# Patient Record
Sex: MALE | Race: WHITE | URBAN - METROPOLITAN AREA
[De-identification: names, ages, dates, MRNs, and addresses within clinical notes are randomized per-mention and may not be internally consistent; named-entity substitution may affect disease eponyms.]

---

## 2021-01-14 ENCOUNTER — OFFICE VISIT (OUTPATIENT)
Dept: ORTHOPEDIC SURGERY | Age: 56
End: 2021-01-14
Payer: COMMERCIAL

## 2021-01-14 VITALS — HEIGHT: 70 IN | WEIGHT: 180 LBS | BODY MASS INDEX: 25.77 KG/M2

## 2021-01-14 DIAGNOSIS — M25.511 RIGHT SHOULDER PAIN, UNSPECIFIED CHRONICITY: Primary | ICD-10-CM

## 2021-01-14 DIAGNOSIS — S46.011A TRAUMATIC COMPLETE TEAR OF RIGHT ROTATOR CUFF, INITIAL ENCOUNTER: ICD-10-CM

## 2021-01-14 PROCEDURE — 99203 OFFICE O/P NEW LOW 30 MIN: CPT | Performed by: ORTHOPAEDIC SURGERY

## 2021-01-14 RX ORDER — FEXOFENADINE HYDROCHLORIDE 60 MG/1
60 TABLET, FILM COATED ORAL PRN
COMMUNITY

## 2021-01-14 RX ORDER — TERBINAFINE HYDROCHLORIDE 250 MG/1
TABLET ORAL
COMMUNITY
Start: 2020-10-09

## 2021-01-14 RX ORDER — AZELASTINE 1 MG/ML
SPRAY, METERED NASAL
COMMUNITY
Start: 2020-03-23

## 2021-01-14 RX ORDER — LOSARTAN POTASSIUM 25 MG/1
TABLET ORAL
COMMUNITY
Start: 2020-12-31 | End: 2021-06-03

## 2021-01-14 RX ORDER — EPINEPHRINE 0.3 MG/.3ML
INJECTION SUBCUTANEOUS
COMMUNITY
Start: 2020-04-06 | End: 2021-06-03

## 2021-01-14 RX ORDER — LANSOPRAZOLE 30 MG/1
CAPSULE, DELAYED RELEASE ORAL
COMMUNITY
Start: 2020-12-23

## 2021-01-14 SDOH — HEALTH STABILITY: MENTAL HEALTH: HOW MANY STANDARD DRINKS CONTAINING ALCOHOL DO YOU HAVE ON A TYPICAL DAY?: NOT ASKED

## 2021-01-14 NOTE — LETTER
[facility]  Surgery Precert & Billing Form:    DEMOGRAPHICS:                                                                                                       Patient Name:  Edyta Oliveros  Patient :  1965   Patient SS#:      Patient Phone:  [phone] (home) [phone] (work) Alt. Patient Phone:    Patient Address:  [place] [id number]    PCP:  No primary care provider on file. Insurance: Work comp     DIAGNOSIS & PROCEDURE:                                                                                      Diagnosis:   Right shoulder pain, unspecified chronicity  - Primary M25.511       Operation: right    SURGERY  INFORMATION  Date of Surgery:      Location:       Type:    Outpatient  23 hour hold:  No  Surgeon:        Ki Hill.  Jennifer Snowden MD         21     BILLING INFORMATION:                                                                                                Physician Procedure                                            CPT Codes                      PA, or Fellow Procedure                                      CPT Codes                      Precert information:

## 2021-01-14 NOTE — PROGRESS NOTES
[facility] [address] [facility]  Office Visit  Shoulder Pain  Date:  2021    Name:  Carly Conn  Address:  [address]    :  1965      Age:   60 y.o.    SSN:  [id number]      Medical Record Number:  <[id number]>    Chief Complaint:    Right right shoulder Pain    HPI:   Carly Conn is a 60 y.o. right hand dominant male who presents today for evaluation of the right shoulder. Patient is here for second opinion over his right shoulder. Patient is status post right shoulder arthroscopy with rotator cuff repair and open subpectoral biceps tenodesis on  by Dr. Shu Dunn. Patient's postoperative course was complicated by stiffness as well as increasing pain. He did not progress well with physical therapy despite his compliance with his sling and other postoperative recommendations. He was then followed up and another MRI was ordered which found him to have a retear of the rotator cuff. His prior surgeon did not feel that he was repairable and the patient is here today for a second opinion. 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 He denies any new numbness, tingling, fevers, chills, chest pain, shortness of breath, or any other new significant symptoms. Pain Assessment  Location of Pain: Shoulder  Location Modifiers: Right  Severity of Pain: 0  Quality of Pain: Sharp  Duration of Pain: A few minutes  Frequency of Pain: Intermittent  Aggravating Factors:  Other (Comment)  Limiting Behavior: Yes  Relieving Factors: Nsaids  Result of Injury: Yes  Work-Related Injury: Yes  Are there other pain locations you wish to document?: No    Past History:  Past Medical History:   Diagnosis Date    HTN (hypertension)        Past Surgical History:   Procedure Laterality Date    RECTAL SURGERY  SHOULDER SURGERY Right     TONSILLECTOMY         Social History     Tobacco Use    Smoking status: Never Smoker    Smokeless tobacco: Never Used   Substance Use Topics    Alcohol use: Never     Frequency: Never     Binge frequency: Never    Drug use: Never        Family History:  family history is not on file. Current Outpatient Medications:     terbinafine (LAMISIL) 250 MG tablet, , Disp: , Rfl:     mupirocin (BACTROBAN) 2 % ointment, , Disp: , Rfl:     losartan (COZAAR) 25 MG tablet, , Disp: , Rfl:     lansoprazole (PREVACID) 30 MG delayed release capsule, , Disp: , Rfl:     fexofenadine (ALLEGRA) 60 MG tablet, Take 60 mg by mouth as needed, Disp: , Rfl:     EPINEPHrine (EPIPEN) 0.3 MG/0.3ML SOAJ injection, One injection as needed for allergic reaction. May repeat., Disp: , Rfl:     azelastine (ASTELIN) 0.1 % nasal spray, USE 2 SPRAYS IN EACH NOSTRIL 2 TIMES DAILY, Disp: , Rfl:     MELOXICAM PO, Take by mouth, Disp: , Rfl:     Allergies   Allergen Reactions    Penicillins Hives     NOT REALLY SURE  LONG TIME AGO  NOT REALLY SURE  LONG TIME AGO           Review of Systems: A 14 point review of systems available in the scanned medical record as documented by the patient on 1/14/2021. Today's review pertinent items are noted in HPI. No notes on file    Physical Exam:  Ht 5' 10\" (1.778 m)   Wt 180 lb (81.6 kg)   BMI 25.83 kg/m²     General: No acute distress, well nourished  CV: No obvious peripheral edema. Normal peripheral pulses  Neuro: Alert & oriented x 3  Psych: Normal mood and affect    Right shoulder exam    Inspection:  Held in a normal posture. Normal contour at the acromioclavicular joint. No swelling, ecchymosis, or erythema about the shoulder. No atrophy appreciated. No scapular winging.      Palpation: Tenderness to palpation over the greater tuberosity, no tenderness over the [facility] joint or bicipital groove Range of Motion: Active and passive range of motion is limited to 110 degrees of forward flexion, 15 degrees of external rotation and internal rotation to the belt loop. Internal rotation does improve to the midline with passive motion    Strength: 4/5 supraspinatus, infraspinatus, and 4/5 subscapularis muscle strength. Stability: No anterior instability. No posterior instability. Special Tests: Positive champagne toast, positive [place]    Other findings: The skin is warm dry and well perfused. 2+ radial pulse. Sensation is intact to light touch over the deltoid. Radiographic:  Prior MRI was reviewed with the patient which shows a type II failure of the supraspinatus repair with some superior subscap tendon tearing    Assessment:  Angie Hernandez is a 60 y.o. male with right shoulder:  1. Recurrent rotator cuff tear  2. Subscapularis tear    Impression:  Encounter Diagnoses   Name Primary?  Right shoulder pain, unspecified chronicity Yes    Traumatic complete tear of right rotator cuff, initial encounter        Office Procedures:  No orders of the defined types were placed in this encounter. Plan:   Pertinent imaging was reviewed. The etiology, natural history, and treatment options for the disorder were discussed. The roles of activity modifications, medications, injections, physical therapy, and surgical interventions were all described to the patient and questions were answered. We had a long discussion with Linda Dhillon today regarding his right shoulder rotator cuff tear. At this point we would recommend revision rotator cuff repair with patch augmentation and subscap repair. After explaining the procedure to the patient along with its risks and benefits the patient elected to proceed. We did state that we recommend doing this sooner than later but it is not an emergency. The patient needs time to get his affairs in order and to make sure the form is covered prior to signing up. We will tentatively plan on February 1 [place] [facility]. He will need Covid testing as well as clearance from his primary care physician    Sierra Howe, [address] [facility]     01/14/21  2:53 PM    The encounter with Sydni Cardoso was supervised by Dr Main Wheeler who personally examined the patient and reviewed the plan. This dictation was performed with a verbal recognition program (DRAGON) and it was checked for errors. It is possible that there are still dictated errors within this office note. If so, please bring any errors to my attention for an addendum. All efforts were made to ensure that this office note is accurate.   ___________________  I was physically present and personally supervised the Orthopaedic Sports Medicine Fellow in the evaluation and development of a treatment plan for this patient. I personally interviewed the patient and performed a physical examination. In addition, I discussed the patient's condition and treatment options with them. I have also reviewed and agree with the past medical, family and social history unless otherwise noted. All of the patient's questions were answered. Nancy Wheeler MD, PhD  1/14/2021

## 2021-01-20 ENCOUNTER — TELEPHONE (OUTPATIENT)
Dept: ORTHOPEDIC SURGERY | Age: 56
End: 2021-01-20

## 2021-01-21 ENCOUNTER — TELEPHONE (OUTPATIENT)
Dept: ORTHOPEDIC SURGERY | Age: 56
End: 2021-01-21

## 2021-01-21 ENCOUNTER — OFFICE VISIT (OUTPATIENT)
Dept: ORTHOPEDIC SURGERY | Age: 56
End: 2021-01-21
Payer: COMMERCIAL

## 2021-01-21 VITALS — WEIGHT: 180 LBS | BODY MASS INDEX: 25.77 KG/M2 | HEIGHT: 70 IN

## 2021-01-21 DIAGNOSIS — S46.011A TRAUMATIC COMPLETE TEAR OF RIGHT ROTATOR CUFF, INITIAL ENCOUNTER: ICD-10-CM

## 2021-01-21 DIAGNOSIS — M25.511 RIGHT SHOULDER PAIN, UNSPECIFIED CHRONICITY: Primary | ICD-10-CM

## 2021-01-21 DIAGNOSIS — M75.121 NONTRAUMATIC COMPLETE TEAR OF RIGHT ROTATOR CUFF: ICD-10-CM

## 2021-01-21 PROBLEM — F32.A DEPRESSION: Status: ACTIVE | Noted: 2021-01-21

## 2021-01-21 PROBLEM — K62.89 ANORECTAL PAIN: Status: ACTIVE | Noted: 2019-11-20

## 2021-01-21 PROBLEM — J30.2 SEASONAL ALLERGIES: Status: ACTIVE | Noted: 2021-01-21

## 2021-01-21 PROBLEM — M50.30 DEGENERATIVE DISC DISEASE, CERVICAL: Status: ACTIVE | Noted: 2020-07-16

## 2021-01-21 PROBLEM — M47.22 OSTEOARTHRITIS OF SPINE WITH RADICULOPATHY, CERVICAL REGION: Status: ACTIVE | Noted: 2020-07-16

## 2021-01-21 PROBLEM — K60.2 ANAL FISSURE: Status: ACTIVE | Noted: 2019-12-05

## 2021-01-21 PROBLEM — M75.41 IMPINGEMENT SYNDROME, SHOULDER, RIGHT: Status: ACTIVE | Noted: 2020-05-19

## 2021-01-21 PROBLEM — K62.89 PROCTITIS: Status: ACTIVE | Noted: 2019-11-12

## 2021-01-21 PROBLEM — M54.6 CHRONIC LEFT-SIDED THORACIC BACK PAIN: Status: ACTIVE | Noted: 2018-04-26

## 2021-01-21 PROBLEM — F41.9 ANXIETY: Status: ACTIVE | Noted: 2021-01-21

## 2021-01-21 PROBLEM — K59.09 CONSTIPATION, CHRONIC: Status: ACTIVE | Noted: 2018-07-27

## 2021-01-21 PROBLEM — E78.01 FAMILIAL HYPERCHOLESTEROLEMIA: Status: ACTIVE | Noted: 2020-01-29

## 2021-01-21 PROBLEM — I10 ESSENTIAL HYPERTENSION: Status: ACTIVE | Noted: 2020-12-31

## 2021-01-21 PROBLEM — G89.29 CHRONIC LEFT-SIDED THORACIC BACK PAIN: Status: ACTIVE | Noted: 2018-04-26

## 2021-01-21 PROBLEM — M54.12 CERVICAL RADICULOPATHY: Status: ACTIVE | Noted: 2020-05-19

## 2021-01-21 PROCEDURE — 99213 OFFICE O/P EST LOW 20 MIN: CPT | Performed by: ORTHOPAEDIC SURGERY

## 2021-01-21 NOTE — PROGRESS NOTES
Chief Complaint  Chief Complaint   Patient presents with    Shoulder Pain     PREOP RIGHT SHOULDER       History of Present Illness:  Tiffanie Patino is a 60 y.o. male here for follow-up and preop evaluation of the right shoulder. Patient had right shoulder arthroscopic rotator cuff repair and open subpectoral biceps tenodesis on August 25 by Dr. Naz Kapoor. Unfortunately, his postop recovery has been complicated by increasing pain and difficulty with physical therapy demands. He feels that he has not been able to regain his strength or function and is now limited by loss of range of motion. He presented on 1/14/2021 for a second opinion and repeat MRI and was found to have a retear of the supraspinatus tendon. We also found tearing of the superior aspect of the sub-scapularis. The patient presents today to review the procedure at hand and to answer a few questions. He denies any new injury to the shoulder since the last visit. The patient is currently on the operative schedule for February 1, 2021 at [address] History:  Patient's medications, allergies, past medical, surgical, social and family histories were reviewed and updated as appropriate. Pertinent items are noted in HPI  Review of systems reviewed from Patient History Form dated on 1/21/2021 and available in the patient's chart under the Media tab. Vital Signs: There were no vitals filed for this visit. Neuro: Alert & oriented x 3,  normal,  no focal deficits noted. Normal affect. Eyes: sclera clear  Ears: Normal external ear  Mouth:  No perioral lesions  Pulm: Respirations unlabored and regular  Pulse: Regular rate and rhythm   Skin: Warm, well perfused      Constitutional: The physical examination finds the patient to be well-developed and well-nourished. The patient is alert and oriented x3 and was cooperative throughout the visit.       Right shoulder exam Plan: Today we were able to discuss his upcoming surgery which is scheduled as a right shoulder arthroscopy with revision supraspinatus tendon repair with patch augmentation and possible subscapularis repair. We were able to answer all of his questions during this visit. We discussed the necessity of following up with his PCP for his preoperative assessment as well as a Covid test at [facility].  We will see the patient in the preoperative holding area on February 1. Haroldo Anderson is in agreement with this plan. All questions were answered to patient's satisfaction and was encouraged to call with any further questions. Alessandra Villarreal, [address] [facility]     01/21/21  4:41 PM  The encounter with Geeta Virk was supervised by Dr. Raza Hector who personally examined the patient and reviewed the plan. This dictation was performed with a verbal recognition program (DRAGON) and it was checked for errors. It is possible that there are still dictated errors within this office note. If so, please bring any errors to my attention for an addendum. All efforts were made to ensure that this office note is accurate.   _______________  I was physically present and personally supervised the Orthopaedic Sports Medicine Fellow in the evaluation and development of a treatment plan for this patient. I personally interviewed the patient and performed a physical examination. In addition, I discussed the patient's condition and treatment options with them. I have also reviewed and agree with the past medical, family and social history unless otherwise noted. All of the patient's questions were answered. Nancy Hector MD, PhD  1/21/2021

## 2021-01-22 ENCOUNTER — TELEPHONE (OUTPATIENT)
Dept: ORTHOPEDIC SURGERY | Age: 56
End: 2021-01-22

## 2021-01-22 NOTE — TELEPHONE ENCOUNTER
General Question     Subject: SX 02/01/21 R SHOULDER / QUESTION ABOUT PHYSICAL THERAPY & PAPERWORK  Patient and /or Facility Request: [place] Number: [phone]

## 2021-01-22 NOTE — TELEPHONE ENCOUNTER
General Question     Subject: PATIENT STATED HE WAS RETURNING [address].   Patient Enedelia Pollock  Contact Number: [phone]

## 2021-02-02 ENCOUNTER — TELEPHONE (OUTPATIENT)
Dept: ORTHOPEDIC SURGERY | Age: 56
End: 2021-02-02

## 2021-02-02 NOTE — TELEPHONE ENCOUNTER
Medical Facility Question     Facility Name: [address] / [facility]  Contact Name: Akash Headings  Contact Number: [phone]  Request or Information: ADAM DORSEY S / [facility] #  [phone]  PATIENT HAS APPOINTMENT TODAY @ 2:00PM

## 2021-02-02 NOTE — TELEPHONE ENCOUNTER
Ruben Moody is calling from physical therapy needing a script for physical therapy and a protocol to be faxed to [phone]. Patient is coming today at [facility] can be reached at [phone].

## 2021-02-09 ENCOUNTER — OFFICE VISIT (OUTPATIENT)
Dept: ORTHOPEDIC SURGERY | Age: 56
End: 2021-02-09

## 2021-02-09 VITALS — HEIGHT: 70 IN | WEIGHT: 180 LBS | BODY MASS INDEX: 25.77 KG/M2

## 2021-02-09 DIAGNOSIS — M75.121 NONTRAUMATIC COMPLETE TEAR OF RIGHT ROTATOR CUFF: ICD-10-CM

## 2021-02-09 DIAGNOSIS — M25.511 RIGHT SHOULDER PAIN, UNSPECIFIED CHRONICITY: Primary | ICD-10-CM

## 2021-02-09 DIAGNOSIS — S46.011A TRAUMATIC COMPLETE TEAR OF RIGHT ROTATOR CUFF, INITIAL ENCOUNTER: ICD-10-CM

## 2021-02-09 PROCEDURE — 99024 POSTOP FOLLOW-UP VISIT: CPT | Performed by: ORTHOPAEDIC SURGERY

## 2021-02-09 RX ORDER — ONDANSETRON 4 MG/1
4 TABLET, FILM COATED ORAL EVERY 6 HOURS PRN
COMMUNITY
Start: 2021-02-01 | End: 2021-02-11

## 2021-02-09 RX ORDER — PSEUDOEPHEDRINE HCL 30 MG
100 TABLET ORAL 2 TIMES DAILY
COMMUNITY
Start: 2021-02-01 | End: 2021-02-16

## 2021-02-09 RX ORDER — MELOXICAM 15 MG/1
15 TABLET ORAL DAILY PRN
COMMUNITY

## 2021-02-09 RX ORDER — OXYCODONE HYDROCHLORIDE AND ACETAMINOPHEN 5; 325 MG/1; MG/1
1 TABLET ORAL EVERY 6 HOURS PRN
Qty: 28 TABLET | Refills: 0 | Status: SHIPPED | OUTPATIENT
Start: 2021-02-09 | End: 2021-02-16

## 2021-02-09 NOTE — PROGRESS NOTES
Neurovascular: Sensation to light touch is intact, no motor deficits, palpable radial pulses 2+    Radiology:     Plain radiographs not obtained today. Assessment :  Mr. Rajendra Culver is a 60 y.o. patient who underwent a right shoulder arthroscopy with limited debridement, capsular release with lysis of adhesion, arthroscopic revision rotator cuff repair with collagen patch augmentation on 2/1/2021       Impression:  Encounter Diagnoses   Name Primary?  Right shoulder pain, unspecified chronicity Yes    Traumatic complete tear of right rotator cuff, initial encounter     Nontraumatic complete tear of right rotator cuff        Office Procedures:  Orders Placed This Encounter   Procedures    Amb External Referral To Physical Therapy     Referral Priority:   Routine     Referral Type:   Consult for Advice and Opinion     Referral Reason:   Patient Preference     Requested Specialty:   Physical Therapy     Number of Visits Requested:   1       Treatment Plan:    Overall the patient is doing well. The pain is well-controlled. We recommend that he wear the UltraSling brace at all times with the exception of clothing, bathing of physical therapy. The patient was told that he is restricted from driving for at least 3 weeks postop. We will give a print out of their physical therapy order. All of his questions were fully answered today. We would like to see him back in 2 weeks for follow-up visit. 2:28 PM      Charlene Posey PA-C  Orthopaedic Sports Medicine Physician Assistant    During this examination, ICharlene PA-C, functioned as a scribe for Dr. John Arnold. This dictation was performed with a verbal recognition program (DRAGON) and it was checked for errors. It is possible that there are still dictated errors within this office note. If so, please bring any errors to my attention for an addendum. All efforts were made to ensure that this office note is accurate. _____________________  I, Dr. Rayo Carroll, personally performed the services described in this documentation as described by Tatyana Clements PA-C in my presence, and it is both accurate and complete. Nancy Palmer MD, PhD  2/9/2021

## 2021-02-23 ENCOUNTER — OFFICE VISIT (OUTPATIENT)
Dept: ORTHOPEDIC SURGERY | Age: 56
End: 2021-02-23

## 2021-02-23 VITALS — WEIGHT: 180 LBS | HEIGHT: 70 IN | BODY MASS INDEX: 25.77 KG/M2

## 2021-02-23 DIAGNOSIS — S46.011A TRAUMATIC COMPLETE TEAR OF RIGHT ROTATOR CUFF, INITIAL ENCOUNTER: ICD-10-CM

## 2021-02-23 DIAGNOSIS — M75.121 NONTRAUMATIC COMPLETE TEAR OF RIGHT ROTATOR CUFF: ICD-10-CM

## 2021-02-23 DIAGNOSIS — M25.511 RIGHT SHOULDER PAIN, UNSPECIFIED CHRONICITY: Primary | ICD-10-CM

## 2021-02-23 PROCEDURE — 99024 POSTOP FOLLOW-UP VISIT: CPT | Performed by: ORTHOPAEDIC SURGERY

## 2021-02-23 NOTE — LETTER
Shoulder Rehabilitation Referral    Patient Name: Vu Fabian      YOB: 1965    Diagnosis: s/p right shoulder rotator cuff repair    Surgery Date: 2/1/21    Precautions: rotator cuff    Post Op Instructions:  [] Continuous passive motion (CPM)  [x] Elbow range of motion  [] Exercise in plane of scapula  []  Strengthening     [] Pulley and instruction   [x] Home exercise program (copy to patient)   [x] Sling when arm at risk  [] Sling or brace at all times   [x] AAROM: Forward elevation to 140           [x] AAROM: External rotation  To 40   [] Isometric external rotator strengthening [] AAROM: internal rotation: up the back  [] Isometric abductor strengthening  [] AAROM: Internal abduction     [] Isometric internal rotator strengthening [] AAROM: cross-body adduction             Stretching:     Strengthening:  [] Four quadrant (FE, ER, IR, CBA)  [] Sleeper stretch    [] Rotator cuff (ER, IR, Abd)  [] Forward Elevation    [] External Rotators     [] External Rotation    [] Internal Rotators  [] Internal Rotation: up/back   [] Abductors     [] Internal Rotation: supine in abduction [] Flexors  [] Cross-body abduction    [] Extensors  [] Pendulum (FE, Abd/Add, cw/ccw)  [x] Scapular Stabilizers   [] Wall-walking (FE, Abd)    [x] Shoulder shrugs     [x] Table slides      [x] Rhomboid pinch  [x] Elbow (flex, ext, pron, sup)    [] Lat.  Pull downs     [] Medial epicondylitis program   [] Forward punch   [] Lateral epicondylitis program   [] Internal rotators     [] Progressive resistive exercises  [] Bench Press        [] Bench press plus  Activities:     [] Lateral pull-downs  [] Rowing     [] Progressive two-hand supine press  [] Stepper/Exercise bike   [] Biceps: curls/supination  [] Swimming  [] Water exercises    Modalities: PRN    Return to Sport:  [] Ultrasound     [] Plyometrics  [] Iontophoresis    [] Rhythmic stabilization  [] Moist heat     [] Core strengthening

## 2021-02-23 NOTE — PROGRESS NOTES
History of Present Illness:  Jason Renee is a pleasant 60 y.o. male who presents for a post operative visit. He is 3 weeks post right shoulder arthroscopy with limited debridement, capsular release with lysis of adhesion, arthroscopic revision rotator cuff repair with collagen patch augmentation on 2/1/2021 . Overall He is doing okay and feels that their pain is well controlled with current pain medications. He has been compliant with wearing the UltraSling brace at all times. He has continued in physical therapy at [facility] in [place], [place]. He does report some soreness and stiffness, especially in the morning. He removed his sling last night during the night and reports he has more soreness this morning than usual. He denies fevers, chills, numbness, tingling, and shortness of breath. Medical History:  Patient's medications, allergies, past medical, surgical, social and family histories were reviewed and updated as appropriate. Patient has had no medical changes since last evaluated        Review of Systems  A 14 point review of systems was completed by the patient on 1/14/21 and is available in the media section of the scanned medical record and was reviewed on 2/23/2021. Vital Signs: There were no vitals filed for this visit. General/Appearance: Alert and oriented and in no apparent distress. Skin:  There are no skin lesions, cellulitis, or extreme edema. The patient has warm and well-perfused Bilateral upper extremities with brisk capillary refill. Right Shoulder Exam:    Inspection: Shoulder incision is clean, dry and intact and well approximated. There is no erythema, drainage or other signs of infection    Palpation:  No crepitus to gentle motion    Active Assisted Range of Motion: Forward Elevation 90    Passive Range of Motion:  Not assessed today secondary to patients soreness    Strength:  Deferred    Special Tests:  Deferred. Neurovascular: Sensation to light touch is intact, no motor deficits, palpable radial pulses 2+    Radiology:     No new XR obtained at this time. Assessment :  Mr. Tiffanie Patino is a pleasant 60 y.o. patient who is now 3 weeks post op. He is improving slowly, and isn't having any issues following the above mentioned procedure. Impression:  Encounter Diagnoses   Name Primary?  Right shoulder pain, unspecified chronicity Yes    Traumatic complete tear of right rotator cuff, initial encounter     Nontraumatic complete tear of right rotator cuff        Office Procedures:  Orders Placed This Encounter   Procedures    External Referral To Physical Therapy     Referral Priority:   Routine     Referral Type:   Eval and Treat     Referral Reason:   Specialty Services Required     Requested Specialty:   Physical Therapy     Number of Visits Requested:   1       Treatment Plan:    Overall Tiffanie Patino is doing well. He may begin to wean out of the Ultrasling brace when at home, but should continue to wear it when at risk. He may begin to drive but must remove the sling when doing so. Additionally, He must be off of pain medications during the day when driving. We recommend He continue in physical therapy at [facility] in [place], [place]. We will progress therapy at this time. All of his questions were fully answered today. We would like to see Tiffanie Patino back in 3 weeks for follow-up visit. Giles Castillo am scribing for and in the presence of Dr. Yeny Frazier. The history taking and physical examination were performed by Dr. Yanni Reyes. All counseling during the appointment was performed between the patient and Dr. Yanni Reyes. 02/23/21  10:18 AM  _________________  I, Dr. Yeny Frazier, personally performed the services described in this documentation as described by BRENDA Anderson in my presence, and it is both accurate and complete. Nancy Reyes MD, PhD  2/23/2021

## 2021-02-24 ENCOUNTER — TELEPHONE (OUTPATIENT)
Dept: ORTHOPEDIC SURGERY | Age: 56
End: 2021-02-24

## 2021-02-26 ENCOUNTER — TELEPHONE (OUTPATIENT)
Dept: ORTHOPEDIC SURGERY | Age: 56
End: 2021-02-26

## 2021-02-26 NOTE — TELEPHONE ENCOUNTER
received a fax from Stepan Calderon at [address] [facility].  She is requesting a work status from 2/9/2021 office visit.

## 2021-03-01 ENCOUNTER — TELEPHONE (OUTPATIENT)
Dept: ORTHOPEDIC SURGERY | Age: 56
End: 2021-03-01

## 2021-03-01 NOTE — TELEPHONE ENCOUNTER
General Question     Subject: POST-OP SURGERY CHAIR  Patient and /or Facility Request: Jovana Fallon  Contact Number: [phone]    PATIENT WOULD LIKE TO KNOW IF HE STILL NEEDS TO USE THE CHAIR REGARDING HIS 2/1/21 SURGERY.  PLEASE CALL BACK PATIENT HAD ABOVE NUMBER

## 2021-03-08 ENCOUNTER — TELEPHONE (OUTPATIENT)
Dept: ORTHOPEDIC SURGERY | Age: 56
End: 2021-03-08

## 2021-03-08 NOTE — TELEPHONE ENCOUNTER
General Question     Subject: RT SHOULDER PAIN  Patient and /or Facility Request: Bonnie Bridges  Contact Number: [phone]    PATIENT WOULD LIKE TO SPEAK TO SOME REGARDING THE PAIN HE'S HAVING ON HIS RT SHOULDER. HAD SURGERY ON ON 2/1/21.  IT'S HARD FOR HIM TO USE THAT SIDE DUE TO THE PAIN

## 2021-03-09 ENCOUNTER — OFFICE VISIT (OUTPATIENT)
Dept: ORTHOPEDIC SURGERY | Age: 56
End: 2021-03-09

## 2021-03-09 VITALS — BODY MASS INDEX: 25.77 KG/M2 | WEIGHT: 180 LBS | HEIGHT: 70 IN

## 2021-03-09 DIAGNOSIS — Z98.890 S/P RIGHT ROTATOR CUFF REPAIR: Primary | ICD-10-CM

## 2021-03-09 PROCEDURE — 99024 POSTOP FOLLOW-UP VISIT: CPT | Performed by: ORTHOPAEDIC SURGERY

## 2021-03-09 RX ORDER — TRAMADOL HYDROCHLORIDE 50 MG/1
50 TABLET ORAL EVERY 4 HOURS PRN
Qty: 30 TABLET | Refills: 0 | Status: SHIPPED | OUTPATIENT
Start: 2021-03-09 | End: 2021-04-08 | Stop reason: SDUPTHER

## 2021-03-09 NOTE — PROGRESS NOTES
Chief Complaint    Follow-up (Right Shoulder)      History of Present Illness:  Tiffanie Patino is a pleasant, 60 y.o., male, here today for follow up of his right shoulder. We are seeing this patient back earlier than his scheduled appointment due to recent increased pain. To recap, this patient has a history of undergoing two surgeries stemming from a work-related injury. This is being covered under [facility]. The first surgery was performed by Dr. Gary Gra. The more recent surgery was performed by Dr. Yanni Reyes - he is now 5 weeks post right shoulder arthroscopy with limited debridement, capsular release with lysis of adhesion, arthroscopic revision rotator cuff repair with collagen patch augmentation on 2/1/2021. He has continued in physical therapy at [address]. He complains of pain over his lateral shoulder. He is having difficulty sleeping as well. He reports no new injuries or setbacks. Pain Assessment  Location of Pain: Shoulder  Location Modifiers: Right  Severity of Pain: 6  Quality of Pain: Sharp, Aching  Duration of Pain: Persistent  Frequency of Pain: Constant  Aggravating Factors: (general use)  Limiting Behavior: Yes  Relieving Factors: Rest, Ice, Nsaids  Result of Injury: Yes  Work-Related Injury: Yes  Are there other pain locations you wish to document?: No      Medical History:  Patient's medications, allergies, past medical, surgical, social and family histories were reviewed and updated as appropriate. Review of Systems  A 14 point review of systems was completed by the patient on 1/14/21 and is available in the media section of the scanned medical record and was reviewed on 3/9/2021. The review is negative with the exception of those things mentioned in the HPI and Past Medical History    Vital Signs:  Vitals:       General/Appearance: Alert and oriented and in no apparent distress. Skin:  There are no skin lesions, cellulitis, or extreme edema.  The patient has warm and well-perfused Bilateral upper extremities with brisk capillary refill. Right Shoulder Exam:  Inspection: Incision portals healing well. No gross deformities, no signs of infection. Palpation: No crepitiation    Active Range of Motion: Deferred. Elbow motion is normal.    Passive Range of Motion: Forward Elevation 90 with pain, External Rotation 5    Strength: Deferred    Special Tests: No Sonny muscle deformity. Neurovascular: Sensation to light touch is intact, no motor deficits, palpable radial pulses 2+      Radiology:     No new XR obtained at this time. Assessment :  Mr. Raejndra Culver is a pleasant, 60 y.o. patient who is now 5 weeks post right shoulder arthroscopy with limited debridement, capsular release with lysis of adhesion, arthroscopic revision rotator cuff repair with collagen patch augmentation on 2/1/2021. He is trending stiff. However, we have reassured him that this bodes well for healing potential.      Impression:  Encounter Diagnosis   Name Primary?  S/P right rotator cuff repair Yes       Office Procedures:  Orders Placed This Encounter   Procedures    Amb External Referral To Physical Therapy     Referral Priority:   Routine     Referral Type:   Consult for Advice and Opinion     Referral Reason:   Patient Preference     Requested Specialty:   Physical Therapy     Number of Visits Requested:   1       Treatment Plan:  We do feel his pain is stemming from postoperative stiffness. We would like for him to resume an anti-inflammatory - he was previously taking Meloxicam. He does say this raises his blood pressure. We will have him monitor his bp closely. At this point it is too soon for oral steroids. We recommend He continue in physical therapy at [address] in [facility]. A new physical therapy letter was documented in [facility] today. We will have him continue using the CPM chair - he may increase settings. We will call in Tramadol for nighttime use.      We will see Radha Kim back in 3 weeks and/or as needed. All questions were answered to patient's satisfaction and He was encouraged to call with any further questions or concerns. Haroldo Anderson is in agreement with this plan. 3/9/2021  9:18 AM    Janelle Bazzi ATC  [facility] [address] Marizamario Javon    During this examination, I, Jonathan Tapia, functioned as a scribe for Dr. Miles Green. The history taking and physical examination were performed by Dr. Raza Hector. All counseling during the appointment was performed between the patient and Dr. Raza Hector. 3/9/21   _______________  I, Dr. Miles Green, personally performed the services described in this documentation as described by Janelle Bazzi ATC in my presence, and it is both accurate and complete. Nancy Hector MD, PhD  3/9/2021

## 2021-03-18 ENCOUNTER — OFFICE VISIT (OUTPATIENT)
Dept: ORTHOPEDIC SURGERY | Age: 56
End: 2021-03-18

## 2021-03-18 VITALS — BODY MASS INDEX: 25.77 KG/M2 | HEIGHT: 70 IN | WEIGHT: 180 LBS

## 2021-03-18 DIAGNOSIS — M75.121 NONTRAUMATIC COMPLETE TEAR OF RIGHT ROTATOR CUFF: ICD-10-CM

## 2021-03-18 DIAGNOSIS — Z98.890 S/P RIGHT ROTATOR CUFF REPAIR: Primary | ICD-10-CM

## 2021-03-18 DIAGNOSIS — S46.011A TRAUMATIC COMPLETE TEAR OF RIGHT ROTATOR CUFF, INITIAL ENCOUNTER: ICD-10-CM

## 2021-03-18 DIAGNOSIS — M25.511 RIGHT SHOULDER PAIN, UNSPECIFIED CHRONICITY: ICD-10-CM

## 2021-03-18 PROCEDURE — 99024 POSTOP FOLLOW-UP VISIT: CPT | Performed by: ORTHOPAEDIC SURGERY

## 2021-03-18 RX ORDER — METHYLPREDNISOLONE 4 MG/1
TABLET ORAL
Qty: 1 KIT | Refills: 0 | Status: SHIPPED | OUTPATIENT
Start: 2021-03-18 | End: 2021-03-24

## 2021-03-18 NOTE — PROGRESS NOTES
[facility]  Office Visit  Follow up  Date:  3/18/2021    Name:  Mili Franks  Address:  [place] [address]    :  1965      Age:   60 y.o.    SSN:  [id number]      Medical Record Number:  <[id number]>    Chief Complaint:    Follow up for his right shoulder. He is status post right shoulder rotator cuff repair with patch augmentation as well as capsular release on 2021    HPI:   Mili Franks is a 60 y.o. male who is following guarding his right shoulder. Patient was seen last week at which time he was worried because he had an uptake in his pain from his normal postoperative course. At that time we put him back on his meloxicam.  However the meloxicam has elevated his blood pressure and he had to stop taking this. He has continued to have significant right shoulder pain, which he localizes to the posterior aspect of the shoulder. He denies any new numbness, tingling, fevers, chills, chest pain, shortness of breath, or any other new significant symptoms. Pain Assessment  Location of Pain: Shoulder  Location Modifiers: Right  Severity of Pain: 7  Quality of Pain: Sharp, Throbbing  Duration of Pain: Persistent  Frequency of Pain: Intermittent  Aggravating Factors:  Other (Comment)(GEN USE)  Limiting Behavior: Yes  Relieving Factors: Rest  Result of Injury: Yes  Work-Related Injury: Yes  Are there other pain locations you wish to document?: No    Past History:  Past Medical History:   Diagnosis Date    HTN (hypertension)        Past Surgical History:   Procedure Laterality Date    RECTAL SURGERY      SHOULDER SURGERY Right     SHOULDER SURGERY Right 2021    TONSILLECTOMY         Social History     Tobacco Use    Smoking status: Never Smoker    Smokeless tobacco: Never Used   Substance Use Topics    Alcohol use: Never     Frequency: Never     Binge frequency: Never    Drug use: Never        Family History:  family history is not on file. Current Outpatient Medications:     methylPREDNISolone (MEDROL DOSEPACK) 4 MG tablet, Take by mouth., Disp: 1 kit, Rfl: 0    meloxicam (MOBIC) 15 MG tablet, Take 15 mg by mouth daily as needed, Disp: , Rfl:     terbinafine (LAMISIL) 250 MG tablet, , Disp: , Rfl:     mupirocin (BACTROBAN) 2 % ointment, , Disp: , Rfl:     losartan (COZAAR) 25 MG tablet, , Disp: , Rfl:     lansoprazole (PREVACID) 30 MG delayed release capsule, , Disp: , Rfl:     fexofenadine (ALLEGRA) 60 MG tablet, Take 60 mg by mouth as needed, Disp: , Rfl:     EPINEPHrine (EPIPEN) 0.3 MG/0.3ML SOAJ injection, One injection as needed for allergic reaction. May repeat., Disp: , Rfl:     azelastine (ASTELIN) 0.1 % nasal spray, USE 2 SPRAYS IN EACH NOSTRIL 2 TIMES DAILY, Disp: , Rfl:       Allergies   Allergen Reactions    Penicillins Hives     NOT REALLY SURE  LONG TIME AGO             Review of Systems: A 14 point review of systems available in the scanned medical record as documented by the patient on 3/18/21. Today's review pertinent items are noted in HPI. Patient has had no medical changes since last evaluated        Physical Exam:  Ht 5' 10\" (1.778 m)   Wt 180 lb (81.6 kg)   BMI 25.83 kg/m²       General: No acute distress, well nourished  CV: No obvious peripheral edema.  Normal peripheral pulses  Neuro: Alert & oriented x 3  Psych: Normal mood and affect    Right shoulder exam    Patient's incisions are well-healed, no signs of infection or drainage  Patient has passive forward flexion to 120 degrees, active external rotation to about 0 degrees, this corrects about 10 degrees passively  Patient can externally rotate and does seem to be firing his infra and supraspinatus  Gentle circumduction of the shoulder does not reveal any crepitus    Radiographic:  No new imaging today    Assessment:  Abbey Navarro is a 60 y.o. male with:  1. Status post right shoulder revision rotator cuff repair with patch augmentation on 2/1/2021    Impression:  Encounter Diagnoses   Name Primary?  S/P right rotator cuff repair Yes    Right shoulder pain, unspecified chronicity     Traumatic complete tear of right rotator cuff, initial encounter     Nontraumatic complete tear of right rotator cuff        Office Procedures:  Orders Placed This Encounter   Procedures    Amb External Referral To Physical Therapy     Referral Priority:   Routine     Referral Type:   Consult for Advice and Opinion     Referral Reason:   Patient Preference     Requested Specialty:   Physical Therapy     Number of Visits Requested:   1       Plan:   We discussed with the patient that we are not concerned that he has ruptured his repair on exam today. We would like him to start a prednisone taper to help with his inflammation. He is trending a little stiff and we will have him continue his physical therapy. I recommended him using topical Voltaren gel. He can discontinue any use of the sling at this time. Follow-up in 3 to 4 weeks. He is in agreement with this plan. [address] Fellow    The encounter with Rajendra Culver was supervised by Dr Juan Manuel Miller who personally examined the patient and reviewed the plan. This dictation was performed with a verbal recognition program (DRAGON) and it was checked for errors. It is possible that there are still dictated errors within this office note. If so, please bring any errors to my attention for an addendum. All efforts were made to ensure that this office note is accurate.   ____________________-  I was physically present and personally supervised the Orthopaedic Sports Medicine Fellow in the evaluation and development of a treatment plan for this patient. I personally interviewed the patient and performed a physical examination. In addition, I discussed the patient's condition and treatment options with them.  I have also reviewed and agree with the past medical, family and social history unless otherwise noted. All of the patient's questions were answered. Nancy Shaffer MD, PhD  3/18/2021

## 2021-04-08 ENCOUNTER — OFFICE VISIT (OUTPATIENT)
Dept: ORTHOPEDIC SURGERY | Age: 56
End: 2021-04-08

## 2021-04-08 ENCOUNTER — TELEPHONE (OUTPATIENT)
Dept: ORTHOPEDIC SURGERY | Age: 56
End: 2021-04-08

## 2021-04-08 VITALS — WEIGHT: 180 LBS | HEIGHT: 70 IN | BODY MASS INDEX: 25.77 KG/M2

## 2021-04-08 DIAGNOSIS — M25.511 ACUTE PAIN OF RIGHT SHOULDER: ICD-10-CM

## 2021-04-08 DIAGNOSIS — Z98.890 S/P RIGHT ROTATOR CUFF REPAIR: Primary | ICD-10-CM

## 2021-04-08 PROCEDURE — 99024 POSTOP FOLLOW-UP VISIT: CPT | Performed by: ORTHOPAEDIC SURGERY

## 2021-04-08 RX ORDER — TRAMADOL HYDROCHLORIDE 50 MG/1
50 TABLET ORAL EVERY 4 HOURS PRN
Qty: 30 TABLET | Refills: 0 | Status: SHIPPED | OUTPATIENT
Start: 2021-04-08 | End: 2021-04-13

## 2021-04-08 RX ORDER — PREDNISONE 10 MG/1
TABLET ORAL
Qty: 35 TABLET | Refills: 0 | Status: SHIPPED | OUTPATIENT
Start: 2021-04-08 | End: 2021-06-03

## 2021-04-08 RX ORDER — METHYLPREDNISOLONE 4 MG/1
TABLET ORAL
Qty: 1 KIT | Refills: 0 | Status: SHIPPED | OUTPATIENT
Start: 2021-04-08 | End: 2021-04-14

## 2021-04-08 NOTE — TELEPHONE ENCOUNTER
Patient Rylie James is calling to let Dr Francesca Doyle know that he doesn't think he got the right medicine. It is only enough for 6 days and he was to get on that lasted 12 days. Please call patient.     PLEASE CALL [phone]

## 2021-04-08 NOTE — PROGRESS NOTES
History of Present Illness:  Ana Quarles is a 60 y.o. male who presents for a post operative visit. He is status post right shoulder rotator cuff repair with patch augmentation as well as capsular release on 2/1/2021. He is currently 9 weeks post op. He has been experiencing more pain and feels that it was not improved with the steroid tablets. He is unable to take NSAIDS due to his blood pressure. He is using his CPM at home. The patient denies any fevers, chills, numbness, tingling, and shortness of breath. Medical History:  Patient's medications, allergies, past medical, surgical, social and family histories were reviewed and updated as appropriate. Review of Systems  A 14 point review of systems was completed by the patient is available in the media section of the scanned medical record and was reviewed on 4/8/2021. Vital Signs: There were no vitals filed for this visit. General/Appearance: Alert and oriented and in no apparent distress. Skin:  There are no skin lesions, cellulitis, or extreme edema. The patient has warm and well-perfused Bilateral upper extremities with brisk capillary refill.      right Shoulder Exam:    Inspection: right shoulder incision that is clean, dry and intact and well approximated. The Prineo dressing is still in place. Mild ecchymosis and swelling are present as can be expected. There is no erythema, drainage or other signs of infection    Palpation:  No crepitus to gentle motion    Active Range of Motion: Forward elevation of 130, abduction to 70, ER of 20, IR of L4    Passive Range of Motion: Forward elevation to 140, abduction of 120    Strength:  Deferred    Special Tests:  Deferred. Neurovascular: Sensation to light touch is intact, no motor deficits, palpable radial pulses 2+    Radiology:     Plain radiographs not obtained.           Assessment :  Mr. Ana Quarles is a 60 y.o. patient status post right shoulder rotator cuff repair with

## 2021-04-08 NOTE — LETTER
stabilization  [] Iontophoresis    [] Core strengthening   [] Moist heat     [] Sports specific program:   [] Massage         [x] Cryotherapy      [] Electrical stimulation     [] Paraffin  [] Whirlpool  [] TENS    [x] Home exercise program (copy to patient). Perform exercises for:   15     minutes    3      times/day  [x] Supervised physical therapy  Frequency: []  1x week  [x] 2x week  [] 3x week  [] Other:   Duration: [] 2 weeks   [] 4 weeks  [x] 6 weeks  [] Other:     Additional Instructions:     Nancy Bonilla MD, PhD

## 2021-04-15 ENCOUNTER — TELEPHONE (OUTPATIENT)
Dept: ORTHOPEDIC SURGERY | Age: 56
End: 2021-04-15

## 2021-04-15 NOTE — TELEPHONE ENCOUNTER
RECEIVED A CALL FROM JUDI AND BLANCA FROM [employer] [facility]. NEEDING TO KNOW WHAT HIS WORK STATUS IS?  COULD A NOTE PLEASE BE DONE WITH THIS INFO.

## 2021-04-16 NOTE — TELEPHONE ENCOUNTER
That is one of the ones he gave Gracie Choi to fill out and she should be working on it. . I think she took it with her. We will follow up to be sure.

## 2021-04-16 NOTE — TELEPHONE ENCOUNTER
Received another call from Abran Mari.    Following up in work status for [employer]? Wants to know when she can expect this as the patient is not getting paid.

## 2021-04-21 NOTE — TELEPHONE ENCOUNTER
Dean 25 SAYS THAT SHE WAS NOT GIVEN ANY PPW, IS THERE A SPECIFIC FORM THAT NEEDS TO BE FILLED OUT, OR JUST LETTER?

## 2021-05-06 ENCOUNTER — OFFICE VISIT (OUTPATIENT)
Dept: ORTHOPEDIC SURGERY | Age: 56
End: 2021-05-06
Payer: COMMERCIAL

## 2021-05-06 VITALS — BODY MASS INDEX: 25.77 KG/M2 | HEIGHT: 70 IN | WEIGHT: 180 LBS

## 2021-05-06 DIAGNOSIS — Z98.890 S/P RIGHT ROTATOR CUFF REPAIR: Primary | ICD-10-CM

## 2021-05-06 DIAGNOSIS — M25.611 STIFFNESS OF RIGHT SHOULDER JOINT: ICD-10-CM

## 2021-05-06 PROCEDURE — 99213 OFFICE O/P EST LOW 20 MIN: CPT | Performed by: ORTHOPAEDIC SURGERY

## 2021-05-06 PROCEDURE — 20610 DRAIN/INJ JOINT/BURSA W/O US: CPT | Performed by: ORTHOPAEDIC SURGERY

## 2021-05-06 RX ORDER — METHYLPREDNISOLONE ACETATE 40 MG/ML
80 INJECTION, SUSPENSION INTRA-ARTICULAR; INTRALESIONAL; INTRAMUSCULAR; SOFT TISSUE ONCE
Status: COMPLETED | OUTPATIENT
Start: 2021-05-06 | End: 2021-05-06

## 2021-05-06 RX ORDER — LIDOCAINE HYDROCHLORIDE 10 MG/ML
8 INJECTION, SOLUTION INFILTRATION; PERINEURAL ONCE
Status: COMPLETED | OUTPATIENT
Start: 2021-05-06 | End: 2021-05-06

## 2021-05-06 RX ORDER — ACETAMINOPHEN AND CODEINE PHOSPHATE 300; 30 MG/1; MG/1
1 TABLET ORAL NIGHTLY PRN
Qty: 30 TABLET | Refills: 0 | Status: SHIPPED | OUTPATIENT
Start: 2021-05-06 | End: 2021-06-05

## 2021-05-06 RX ADMIN — METHYLPREDNISOLONE ACETATE 80 MG: 40 INJECTION, SUSPENSION INTRA-ARTICULAR; INTRALESIONAL; INTRAMUSCULAR; SOFT TISSUE at 17:28

## 2021-05-06 RX ADMIN — LIDOCAINE HYDROCHLORIDE 8 ML: 10 INJECTION, SOLUTION INFILTRATION; PERINEURAL at 17:27

## 2021-05-06 NOTE — LETTER
Physical Therapy Rehabilitation Referral    Patient Name:  Saurabh Garcia      YOB: 1965    Diagnosis:    1. S/P right rotator cuff repair    2. Stiffness of right shoulder joint        Precautions:     [x] Evaluate and Treat    Post Op Instructions:  [] Continuous passive motion (CPM) [x] Elbow ROM  [x] Exercise in plane of scapula  [x]  Strengthening     [x] Pulley and instruction   [x] Home exercise program (copy to patient)   [] Sling when arm at risk  [] Sling or brace at all times   [] AAROM: Forward elevation to  140            [] AAROM: External rotation  To  40    [x] Isometric external rotator strengthening [] AAROM: internal rotation: up the back  [x] Isometric abductor strengthening  [] AAROM: Internal abduction   [] Isometric internal rotator strengthening [] AAROM: cross-body adduction             Stretching:     Strengthening:  [x] Four quadrant (FE, ER, IR, CBA)  [x] Rotator cuff (ER, IR, Abd)  [x] Forward Elevation    [] External Rotators     [x] External Rotation    [] Internal Rotators  [x] Internal Rotation: up/back   [] Abductors     [x] Internal Rotation: supine in abduction  [x] Sleeper Stretch    [] Flexors  [x] Cross-body abduction    [] Extensors  [x] Pendulum (FE, Abd/Add, cw/ccw)  [x] Scapular Stabilizers   [x] Wall-walking (FE, Abd)        [x] Shoulder shrugs     [x] Table slides (FE)                [x] Rhomboid pinch  [] Elbow (flex, ext, pron, sup)        [] Lat.  Pull downs     [] Medial epicondylitis program       [] Forward punch   [] Lateral epicondylitis program       [] Internal rotators     [x] Progressive resistive exercises  [] Bench Press        [] Bench press plus  Activities:     [] Lateral pull-downs  [x] Rowing     [x] Progressive two-hand supine press  [] Stepper/Exercise bike   [x] Biceps: curls/supination  [] Swimming  [] Water exercises    Modalities:     Return to Sport:  [x] Of Choice      [] Plyometrics  [] Ultrasound     [] Rhythmic stabilization  [] Iontophoresis    [] Core strengthening   [] Moist heat     [] Sports specific program:   [] Massage         [x] Cryotherapy      [] Electrical stimulation     [] Paraffin  [] Whirlpool  [] TENS    [x] Home exercise program (copy to patient). Perform exercises for:   15     minutes    3      times/day  [x] Supervised physical therapy  Frequency: []  1x week  [x] 2x week  [] 3x week  [] Other:   Duration: [] 2 weeks   [] 4 weeks  [x] 6 weeks  [] Other:     Additional Instructions:     Nancy Hernandez MD, PhD

## 2021-05-06 NOTE — PROGRESS NOTES
[facility]  History and Physical  Shoulder Pain    Date:  2021    Name:  Marybeth Morris  Address:  [address]    :  1965      Age:   60 y.o.    SSN:  [id number]      Medical Record Number:  <[id number]>    Reason for Visit:    Follow-up (W/C - Right Shoulder)      HPI:   Marybeth Morris is a 60 y.o. male who presents to our office today for follow up of the right shoulder pain. This patient underwent a revision rotator cuff repair with capsular release of his right shoulder on 2021. Patient reports he continues to have significant pain especially when he moves his arm in certain directions. He also has increased pain at nighttime. He reports the tramadol is no longer helping him. He continues to work hard in physical therapy. He reports he has ran out of visits at this point. He has been going at age 1 for his therapy visits. Denies any new injury since his last visit. Pain Assessment  Location of Pain: Shoulder  Location Modifiers: Right  Quality of Pain: Sharp, Throbbing  Duration of Pain: Persistent  Frequency of Pain: Intermittent  Aggravating Factors: (certain movements)  Limiting Behavior: Yes  Relieving Factors: Rest, Ice  Result of Injury: Yes  Work-Related Injury: Yes  Are there other pain locations you wish to document?: No    Review of Systems    Patient has had no medical changes since last evaluated        Review of Systems:  A 14 point review of systems available in the scanned medical record as documented by the patient on 21. The review is negative with the exception of those things mentioned in the History of Present Illness and Past Medical History. Past Medical History:  Patient's medications, allergies, past medical, surgical, social and family histories were reviewed and updated as appropriate. Allergies:   Allergies   Allergen Reactions    Penicillins Hives     NOT S/P right rotator cuff repair Yes    Stiffness of right shoulder joint        Office Procedures:  Orders Placed This Encounter   Procedures    Amb External Referral To Physical Therapy     Referral Priority:   Routine     Referral Type:   Consult for Advice and Opinion     Referral Reason:   Patient Preference     Requested Specialty:   Physical Therapy     Number of Visits Requested:   1    NV ARTHROCENTESIS ASPIR&/INJ MAJOR JT/BURSA W/O US     80 mg Depomedrol with 8 cc 1% Lidocaine in 10cc syringe with 25G (22G) needle       Plan:   He has received some relief from oral steroids however they have been short-lived. We plan to do a corticosteroid injection today to help alleviate some of the inflammation. We recommend he continue with physical therapy. We did discuss possibly obtaining an MRI when he is 4 months out if he continues to have persistent symptoms. At this point we do feel that his strength is coming along quite well in relation to his rotator cuff and there is less concern for a failure of the repair. All his questions were fully answered today we will see him back in 4 weeks for reevaluation. Risks and benefits of a corticosteroid injection were discussed with Chuck Barnes. 80 milligrams of Depo Medrol and 8 CC of 1% lidocaine were injected in the right shoulder glenohumeral joint following chlorhexidine prep. He  tolerated the procedure well with no immediate adverse sequelae after the injection. 5/6/2021  9:32 AM      Rebecca Barahona PA-C  Orthopaedic Sports Medicine Physician Assistant    During this examination, IRebecca PA-C, functioned as a scribe for Dr. Viridiana De Leon. This dictation was performed with a verbal recognition program (DRAGON) and it was checked for errors. It is possible that there are still dictated errors within this office note. If so, please bring any errors to my attention for an addendum.   All efforts were made to ensure that this office note is

## 2021-05-06 NOTE — LETTER
[address] [address]  Phone: [phone]  Fax: [phone]    Joseph Duran MD        May 6, 2021     Patient: Shalini Conroy   YOB: 1965   Date of Visit: 5/6/2021       To Whom It May Concern: It is my medical opinion that Pasha Mckay should remain out of work until next appt in 4-6 weeks will re-evaluate at that time . If you have any questions or concerns, please don't hesitate to call.     Sincerely,          Joseph Duran MD

## 2021-06-03 ENCOUNTER — OFFICE VISIT (OUTPATIENT)
Dept: ORTHOPEDIC SURGERY | Age: 56
End: 2021-06-03
Payer: COMMERCIAL

## 2021-06-03 VITALS — WEIGHT: 180 LBS | BODY MASS INDEX: 25.77 KG/M2 | HEIGHT: 70 IN

## 2021-06-03 DIAGNOSIS — Z98.890 S/P RIGHT ROTATOR CUFF REPAIR: Primary | ICD-10-CM

## 2021-06-03 DIAGNOSIS — S46.011A TRAUMATIC COMPLETE TEAR OF RIGHT ROTATOR CUFF, INITIAL ENCOUNTER: ICD-10-CM

## 2021-06-03 PROCEDURE — 99213 OFFICE O/P EST LOW 20 MIN: CPT | Performed by: ORTHOPAEDIC SURGERY

## 2021-06-03 RX ORDER — DULOXETIN HYDROCHLORIDE 20 MG/1
CAPSULE, DELAYED RELEASE ORAL
COMMUNITY
Start: 2021-05-06 | End: 2021-06-03

## 2021-06-03 RX ORDER — LOSARTAN POTASSIUM 50 MG/1
TABLET ORAL
COMMUNITY
Start: 2021-05-17

## 2021-06-03 RX ORDER — EPINEPHRINE 0.3 MG/.3ML
INJECTION SUBCUTANEOUS
COMMUNITY
Start: 2021-04-23

## 2021-06-03 RX ORDER — SILDENAFIL 100 MG/1
100 TABLET, FILM COATED ORAL PRN
COMMUNITY
Start: 2021-01-28

## 2021-06-03 NOTE — LETTER
Physical Therapy Rehabilitation Referral    Patient Name:  Janene Muñiz      YOB: 1965    Diagnosis:    1. S/P right rotator cuff repair    2. Stiffness of right shoulder joint        Precautions:     [x] Evaluate and Treat    Post Op Instructions:  [] Continuous passive motion (CPM) [x] Elbow ROM  [x] Exercise in plane of scapula  [x]  Strengthening     [x] Pulley and instruction   [x] Home exercise program (copy to patient)   [] Sling when arm at risk  [] Sling or brace at all times   [] AAROM: Forward elevation to  140            [] AAROM: External rotation  To  40    [x] Isometric external rotator strengthening [] AAROM: internal rotation: up the back  [x] Isometric abductor strengthening  [] AAROM: Internal abduction   [x] Isometric internal rotator strengthening [] AAROM: cross-body adduction             Stretching:     Strengthening:  [x] Four quadrant (FE, ER, IR, CBA)  [x] Rotator cuff (ER, IR, Abd)  [x] Forward Elevation    [] External Rotators     [x] External Rotation    [] Internal Rotators  [x] Internal Rotation: up/back   [] Abductors     [x] Internal Rotation: supine in abduction  [x] Sleeper Stretch    [] Flexors  [x] Cross-body abduction    [] Extensors  [x] Pendulum (FE, Abd/Add, cw/ccw)  [x] Scapular Stabilizers   [x] Wall-walking (FE, Abd)        [x] Shoulder shrugs     [x] Table slides (FE)                [x] Rhomboid pinch  [] Elbow (flex, ext, pron, sup)        [] Lat.  Pull downs     [] Medial epicondylitis program       [] Forward punch   [] Lateral epicondylitis program       [] Internal rotators     [x] Progressive resistive exercises  [] Bench Press        [] Bench press plus  Activities:     [] Lateral pull-downs  [x] Rowing     [x] Progressive two-hand supine press  [] Stepper/Exercise bike   [x] Biceps: curls/supination  [] Swimming  [] Water exercises    Modalities:     Return to Sport:  [x] Of Choice      [] Plyometrics  [] Ultrasound     [] Rhythmic stabilization  [] Iontophoresis    [] Core strengthening   [] Moist heat     [] Sports specific program:   [] Massage         [x] Cryotherapy      [] Electrical stimulation     [] Paraffin  [] Whirlpool  [] TENS    [x] Home exercise program (copy to patient). Perform exercises for:   15     minutes    3      times/day  [x] Supervised physical therapy  Frequency: []  1x week  [x] 2x week  [] 3x week  [] Other:   Duration: [] 2 weeks   [] 4 weeks  [x] 6 weeks  [] Other:     Additional Instructions:   End range stretches  Ramp up strengthening    Nancy Hernandez MD, PhD

## 2021-06-03 NOTE — PROGRESS NOTES
Chief Complaint    Shoulder Pain ( RIGHT SHOULDER)      History of Present Illness:  Lorrie Luna is a pleasant, 60 y.o., male, here today for follow up of his right shoulder. This patient underwent a revision rotator cuff repair with capsular release of his right shoulder on 2/1/2021. He is now 4 months postop. He has continued in physical therapy at . He does complain of end-range discomfort with certain movements. This is all being covered under [facility]. He reports no new injuries or setbacks. Pain Assessment  Location Modifiers: Right  Severity of Pain: 3  Quality of Pain: Aching  Duration of Pain: Persistent  Frequency of Pain: Intermittent  Aggravating Factors: Other (Comment)  Limiting Behavior: Some  Relieving Factors: Rest, Ice, Exercise  Result of Injury: Yes  Work-Related Injury: Yes  Are there other pain locations you wish to document?: No      Medical History:  Patient's medications, allergies, past medical, surgical, social and family histories were reviewed and updated as appropriate. Patient has had no medical changes since last evaluated        Review of Systems  A 14 point review of systems was completed by the patient on 1/14/21 and is available in the media section of the scanned medical record and was reviewed on 6/3/2021. The review is negative with the exception of those things mentioned in the HPI and Past Medical History    Vital Signs: There were no vitals filed for this visit. General/Appearance: Alert and oriented and in no apparent distress. Skin:  There are no skin lesions, cellulitis, or extreme edema. The patient has warm and well-perfused Bilateral upper extremities with brisk capillary refill. Right Shoulder Exam:  Inspection: Incision portals are fully healed No gross deformities, no signs of infection. Palpation: Non-tender to light palpation    Active Range of Motion:   Forward Elevation 150, External Rotation 30, Internal Rotation L2    Passive Range Medicine and [address] [place] [address]    During this examination, I, Anupam Sharpe, functioned as a scribe for Dr. Nabeel Garcia. The history taking and physical examination were performed by Dr. Ramakrishna Norris. All counseling during the appointment was performed between the patient and Dr. Ramakrishna Norris. 6/3/21  ___________________  I, Dr. Nabeel Garcia, personally performed the services described in this documentation as described by Clifford Perez ATC in my presence, and it is both accurate and complete. Nancy Norris MD, PhD  6/3/2021

## 2021-06-09 ENCOUNTER — TELEPHONE (OUTPATIENT)
Dept: ORTHOPEDIC SURGERY | Age: 56
End: 2021-06-09

## 2021-06-10 ENCOUNTER — OFFICE VISIT (OUTPATIENT)
Dept: ORTHOPEDIC SURGERY | Age: 56
End: 2021-06-10
Payer: COMMERCIAL

## 2021-06-10 VITALS — BODY MASS INDEX: 25.77 KG/M2 | WEIGHT: 180 LBS | HEIGHT: 70 IN

## 2021-06-10 DIAGNOSIS — M25.512 LEFT SHOULDER PAIN, UNSPECIFIED CHRONICITY: Primary | ICD-10-CM

## 2021-06-10 DIAGNOSIS — M75.102 TEAR OF LEFT ROTATOR CUFF, UNSPECIFIED TEAR EXTENT, UNSPECIFIED WHETHER TRAUMATIC: ICD-10-CM

## 2021-06-10 PROCEDURE — 99214 OFFICE O/P EST MOD 30 MIN: CPT | Performed by: PHYSICIAN ASSISTANT

## 2021-06-10 RX ORDER — MONTELUKAST SODIUM 10 MG/1
TABLET ORAL
COMMUNITY
Start: 2021-06-03

## 2021-06-10 NOTE — PROGRESS NOTES
with the exception of those things mentioned in the History of Present Illness and Past Medical History. Past History:  Past Medical History:   Diagnosis Date    HTN (hypertension)      Past Surgical History:   Procedure Laterality Date    RECTAL SURGERY      SHOULDER SURGERY Right     SHOULDER SURGERY Right 02/01/2021    TONSILLECTOMY       Current Outpatient Medications on File Prior to Visit   Medication Sig Dispense Refill    montelukast (SINGULAIR) 10 MG tablet TAKE ONE TABLET BY MOUTH ONCE DAILY      EPINEPHrine (AUVI-Q) 0.3 MG/0.3ML SOAJ injection Use as directed      losartan (COZAAR) 50 MG tablet TAKE ONE TABLET BY MOUTH ONCE DAILY      sildenafil (VIAGRA) 100 MG tablet Take 100 mg by mouth as needed      meloxicam (MOBIC) 15 MG tablet Take 15 mg by mouth daily as needed      terbinafine (LAMISIL) 250 MG tablet       mupirocin (BACTROBAN) 2 % ointment       lansoprazole (PREVACID) 30 MG delayed release capsule       fexofenadine (ALLEGRA) 60 MG tablet Take 60 mg by mouth as needed      azelastine (ASTELIN) 0.1 % nasal spray USE 2 SPRAYS IN EACH NOSTRIL 2 TIMES DAILY       No current facility-administered medications on file prior to visit.      Social History     Socioeconomic History    Marital status:      Spouse name: Not on file    Number of children: Not on file    Years of education: Not on file    Highest education level: Not on file   Occupational History    Not on file   Tobacco Use    Smoking status: Never Smoker    Smokeless tobacco: Never Used   Substance and Sexual Activity    Alcohol use: Never    Drug use: Never    Sexual activity: Not on file   Other Topics Concern    Not on file   Social History Narrative    Not on file     Social Determinants of Health     Financial Resource Strain:     Difficulty of Paying Living Expenses:    Food Insecurity:     Worried About Running Out of Food in the Last Year:     [address] in the Last Year: will get an MRI of the left shoulder to evaluate the extent of the tear. Once this is completed we will see him back in our office for reevaluation. 6/10/2021  11:37 AM      Linda Gomez PA-C  Orthopaedic Sports Medicine Physician Assistant    This dictation was performed with a verbal recognition program [facility]) and it was checked for errors. It is possible that there are still dictated errors within this office note. If so, please bring any errors to my attention for an addendum. All efforts were made to ensure that this office note is accurate.

## 2021-06-14 ENCOUNTER — TELEPHONE (OUTPATIENT)
Dept: ORTHOPEDIC SURGERY | Age: 56
End: 2021-06-14

## 2021-06-24 ENCOUNTER — OFFICE VISIT (OUTPATIENT)
Dept: ORTHOPEDIC SURGERY | Age: 56
End: 2021-06-24
Payer: COMMERCIAL

## 2021-06-24 VITALS — BODY MASS INDEX: 25.77 KG/M2 | HEIGHT: 70 IN | WEIGHT: 180 LBS

## 2021-06-24 DIAGNOSIS — Z98.890 S/P RIGHT ROTATOR CUFF REPAIR: Primary | ICD-10-CM

## 2021-06-24 DIAGNOSIS — M75.122 NONTRAUMATIC COMPLETE TEAR OF LEFT ROTATOR CUFF: ICD-10-CM

## 2021-06-24 PROCEDURE — 99213 OFFICE O/P EST LOW 20 MIN: CPT | Performed by: ORTHOPAEDIC SURGERY

## 2021-06-24 NOTE — PROGRESS NOTES
erythema about the shoulder. No atrophy appreciated. No scapular winging. Palpation: Tenderness to palpation of the greater tuberosity of the humerus in the bicipital groove. No tenderness over the [facility] joint     Range of Motion: Passive and active range of motion to 160 degrees of forward elevation and abduction, external rotation to 45 degrees and internal rotation to t10    Strength: 4-/5 supraspinatus, 4-/5 infraspinatus, and 5-/5 subscapularis muscle strength. Stability: No anterior instability. No posterior instability. Special Tests: Positive Jobes and positive champagne toast, negative Troy    Other findings: The skin is warm dry and well perfused. 2+ radial pulse. Sensation is intact to light touch over the deltoid. Right comparison shoulder exam    Inspection:  Held in a normal posture. Normal contour at the acromioclavicular joint. No swelling, ecchymosis, or erythema about the shoulder. No atrophy appreciated. No scapular winging. Palpation:  No subacromial crepitus. No tenderness of the AC joint. No greater tuberosity tenderness. No tenderness in the bicipital groove. Range of Motion: Forward elevation and abduction to 160 degrees, external rotation to 45 degrees and internal rotation to the belt loop    Strength: 5-/5 supraspinatus, infraspinatus, and 5/5 subscapularis muscle strength. Stability: No anterior instability. No posterior instability. Special Tests: Positive Jaz's, positive champagne toast, negative Troy    Other findings: The skin is warm dry and well perfused. 2+ radial pulse. Sensation is intact to light touch over the deltoid. Radiology:     Right MRI  CONCLUSION:   1. Diffuse abnormal signal throughout the infraspinatus and less so supraspinatus.  Findings in    part from repair.  Interstitial partial-thickness tearing occupying at least 50% of the depth    present.  Reactive bursitis present.    2. Debrided superior labrum.  Chronic tearing physical therapist about using his last visit to educate him on a home exercise program    In regards to his left shoulder we do feel that this is an irreparable massive rotator cuff tear. We would recommend nonoperative treatment at this time including cortisone injections and as well as activity modifications. If he wishes to undergo a cortisone injection we will have him make an appointment for his left shoulder and we would be happy to take care of that. Justin Owen is in agreement with this plan. All questions were answered to patient's satisfaction and was encouraged to call with any further questions. Guadalupe Das, [facility]     06/24/21  2:59 PM  The encounter with Shonna Grossman was supervised by Dr Mana Garcia who personally examined the patient and reviewed the plan. This dictation was performed with a verbal recognition program (DRAGON) and it was checked for errors. It is possible that there are still dictated errors within this office note. If so, please bring any errors to my attention for an addendum. All efforts were made to ensure that this office note is accurate.   ____________  I was physically present and personally supervised the Orthopaedic Sports Medicine Fellow in the evaluation and development of a treatment plan for this patient. I personally interviewed the patient and performed a physical examination. In addition, I discussed the patient's condition and treatment options with them. I have also reviewed and agree with the past medical, family and social history unless otherwise noted. All of the patient's questions were answered. Nancy Garcia MD, PhD  6/24/2021

## 2021-07-07 ENCOUNTER — TELEPHONE (OUTPATIENT)
Dept: ORTHOPEDIC SURGERY | Age: 56
End: 2021-07-07

## 2021-07-15 ENCOUNTER — OFFICE VISIT (OUTPATIENT)
Dept: ORTHOPEDIC SURGERY | Age: 56
End: 2021-07-15
Payer: COMMERCIAL

## 2021-07-15 VITALS — WEIGHT: 180 LBS | BODY MASS INDEX: 25.77 KG/M2 | HEIGHT: 70 IN

## 2021-07-15 DIAGNOSIS — M25.512 LEFT SHOULDER PAIN, UNSPECIFIED CHRONICITY: ICD-10-CM

## 2021-07-15 DIAGNOSIS — M75.122 NONTRAUMATIC COMPLETE TEAR OF LEFT ROTATOR CUFF: Primary | ICD-10-CM

## 2021-07-15 PROCEDURE — 20610 DRAIN/INJ JOINT/BURSA W/O US: CPT | Performed by: ORTHOPAEDIC SURGERY

## 2021-07-15 PROCEDURE — 99213 OFFICE O/P EST LOW 20 MIN: CPT | Performed by: ORTHOPAEDIC SURGERY

## 2021-07-15 RX ORDER — METHYLPREDNISOLONE ACETATE 40 MG/ML
80 INJECTION, SUSPENSION INTRA-ARTICULAR; INTRALESIONAL; INTRAMUSCULAR; SOFT TISSUE ONCE
Status: COMPLETED | OUTPATIENT
Start: 2021-07-15 | End: 2021-07-15

## 2021-07-15 RX ORDER — LIDOCAINE HYDROCHLORIDE 10 MG/ML
8 INJECTION, SOLUTION INFILTRATION; PERINEURAL ONCE
Status: COMPLETED | OUTPATIENT
Start: 2021-07-15 | End: 2021-07-15

## 2021-07-15 RX ADMIN — METHYLPREDNISOLONE ACETATE 80 MG: 40 INJECTION, SUSPENSION INTRA-ARTICULAR; INTRALESIONAL; INTRAMUSCULAR; SOFT TISSUE at 17:38

## 2021-07-15 RX ADMIN — LIDOCAINE HYDROCHLORIDE 8 ML: 10 INJECTION, SOLUTION INFILTRATION; PERINEURAL at 17:37

## 2021-07-15 NOTE — PROGRESS NOTES
[address] [facility]  History and Physical  Shoulder Pain    Date:  7/15/2021    Name:  Jess Gillette  Address:  [facility] [address]    :  1965      Age:   60 y.o.    SSN:  [id number]      Medical Record Number:  <[id number]>    Reason for Visit:    Shoulder Pain (F/U LEFT SHOULDER)      HPI:   Jess Gillette is a 60 y.o. male who presents to our office today for follow up of the left shoulder pain. This patient has been found to have an irreparable chronic full-thickness rotator cuff tear in his left shoulder. Patient reports that his pain levels have gotten much worse. He has pain with movement of the shoulder including overhead. He has pain at nighttime and extreme sleep. Has difficulty lifting anything. He has notable weakness that is affecting his day-to-day activities. Denies any new injury since his last visit. Of note we do see him for his right shoulder under Workmen's Comp. He has been let go from his employer as they were not able to accommodate his light duty work restrictions. Pain Assessment  Location of Pain: Shoulder  Location Modifiers: Left  Severity of Pain: 8  Quality of Pain: Aching, Dull, Sharp, Throbbing, Popping, Cracking  Duration of Pain: Persistent  Frequency of Pain: Constant  Aggravating Factors: Other (Comment)  Limiting Behavior: Yes  Relieving Factors: Rest  Result of Injury: Yes  Work-Related Injury: No  Are there other pain locations you wish to document?: No    Patient has had no medical changes since last evaluated        Review of Systems:  A 14 point review of systems available in the scanned medical record as documented by the patient on 6/10/21. The review is negative with the exception of those things mentioned in the History of Present Illness and Past Medical History.       Past Medical History:  Patient's medications, allergies, past medical, surgical, social and family histories were reviewed and updated as appropriate. Allergies: Allergies   Allergen Reactions    Penicillins Hives     NOT REALLY SURE  LONG TIME AGO           Physical Exam:  There were no vitals filed for this visit. General: Rand Matthew is a healthy and well appearing 60 y.o. male who is sitting comfortably in our office in acute distress. Skin:  There are no skin lesions, cellulitis, or extreme edema. The patient has warm and well-perfused Bilateral upper extremities with brisk capillary refill. Eyes: Extra-ocular muscles intact  Mouth: Oral mucosa moist. No perioral lesions  Pulm: Respirations unlabored and regular. Neuro: Alert and oriented x3    left Shoulder Exam:  Inspection: No gross deformities, no signs of infection. Palpation: He has tenderness over the rotator cuff footprint. He has subacromial crepitus present. He has no tenderness at the [facility] joint or posterior joint line    Active Range of Motion: Forward elevation of 150 with a painful arc, abduction of 90, external rotation with elbow at the side 35, internal rotation to the back is T11. Pain on arm descent. Passive Range of Motion: Deferred. Strength: External rotation with resistance with elbow at the side 3/5, internal rotation with resistance with elbow at the side 4/5, champagne toast 3/5    Special Tests:   No Sonny muscle deformity. Neurovascular: Sensation to light touch is intact, no motor deficits, palpable radial pulses 2+    Additional Examinations:    Examination of the contralateral extremity does not show any tenderness, deformity or injury. Range of motion is unremarkable. There is no gross instability. There are no rashes, ulcerations or lesions. Strength and tone are normal.      Radiographic:  X ray not obtained today. Assessment:  Rand Matthew is a 60 y.o. male with a chronic irreparable rotator cuff tear of his left shoulder with early signs of rotator cuff arthropathy.     Impression:  Encounter Diagnoses   Name Primary?  Nontraumatic complete tear of left rotator cuff Yes    Left shoulder pain, unspecified chronicity        Office Procedures:  Orders Placed This Encounter   Procedures    MI ARTHROCENTESIS ASPIR&/INJ MAJOR JT/BURSA W/O US     80 mg Depomedrol with 8 cc 1% Lidocaine in 10cc syringe with 25G (22G) needle     After discussing the risks and benefits of a corticosteroid injection, Kyle Rios did state an understanding and gave verbal consent to proceed. After cleansing the injection site with Chlora-prep and using aseptic techniques,  2 CC of Depo Medrol 40mg/ml and 8 CC of 1% lidocaine were injected in the left glenohumeral and subacromial space. He  tolerated the procedure well with no immediate adverse sequelae after the injection. A bandage was placed over the injection site. Appropriate post injections instructions were given to the patient. Plan: We were able to review his MRI and the radiographs today. Patient is now starting to have some early rotator cuff arthropathy. Nonetheless that he is in a tough situation as his range of motion is well-preserved and would exclude him from a reverse total shoulder arthroplasty. 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 We discussed the superior capsular reconstruction and feel that it is too lengthy of a postoperative rehab for him to consider doing that surgery. He could potentially be a candidate for a balloon arthroplasty so that he can get some relief for a few years before he can be considered for a reverse total shoulder arthroplasty. At this point his best next option is to consider a corticosteroid injection which she was agreeable to. We will see him back in 6 weeks to reevaluate and to further discuss his treatment options.       7/15/2021  9:33 AM      Yordan Solomon PA-C  Orthopaedic Sports Medicine Physician Assistant    During this examination, Yordan BRYSON PA-C, functioned as a scribe for Dr. Yelena Green. This dictation was performed with a verbal recognition program (DRAGON) and it was checked for errors. It is possible that there are still dictated errors within this office note. If so, please bring any errors to my attention for an addendum. All efforts were made to ensure that this office note is accurate.  ______________  I, Dr. Yelena Green, personally performed the services described in this documentation as described by Irais Shahid PA-C in my presence, and it is both accurate and complete. Nancy Garrido MD, PhD  7/15/2021

## 2021-07-15 NOTE — LETTER
Physical Therapy Rehabilitation Referral    Patient Name:  Dian Gutierrez      YOB: 1965    Diagnosis:    1. Nontraumatic complete tear of left rotator cuff    2. Left shoulder pain, unspecified chronicity        Precautions:     [x] Evaluate and Treat    Post Op Instructions:  [] Continuous passive motion (CPM) [] Elbow ROM  [x] Exercise in plane of scapula  []  Strengthening     [x] Pulley and instruction   [x] Home exercise program (copy to patient)   [] Sling when arm at risk  [] Sling or brace at all times   [] AAROM: Forward elevation to  140            [] AAROM: External rotation  To  40    [] Isometric external rotator strengthening [] AAROM: internal rotation: up the back  [x] Isometric abductor strengthening  [] AAROM: Internal abduction   [] Isometric internal rotator strengthening [] AAROM: cross-body adduction             Stretching:     Strengthening:  [x] Four quadrant (FE, ER, IR, CBA)  [x] Rotator cuff (ER, IR, Abd)  [] Forward Elevation    [] External Rotators     [] External Rotation    [] Internal Rotators  [] Internal Rotation: up/back   [] Abductors     [] Internal Rotation: supine in abduction  [] Sleeper Stretch    [] Flexors  [] Cross-body abduction    [] Extensors  [] Pendulum (FE, Abd/Add, cw/ccw)  [x] Scapular Stabilizers   [] Wall-walking (FE, Abd)        [x] Shoulder shrugs     [] Table slides (FE)                [x] Rhomboid pinch  [] Elbow (flex, ext, pron, sup)        [] Lat.  Pull downs     [] Medial epicondylitis program       [] Forward punch   [] Lateral epicondylitis program       [] Internal rotators     [x] Progressive resistive exercises  [] Bench Press        [] Bench press plus  Activities:     [] Lateral pull-downs  [x] Rowing     [] Progressive two-hand supine press  [] Stepper/Exercise bike   [] Biceps: curls/supination  [] Swimming  [] Water exercises    Modalities:     Return to Sport:  [x] Of Choice      [] Plyometrics  [] Ultrasound     [] Rhythmic stabilization  [] Iontophoresis    [] Core strengthening   [] Moist heat     [] Sports specific program:   [] Massage         [x] Cryotherapy      [] Electrical stimulation     [] Paraffin  [] Whirlpool  [] TENS    [x] Home exercise program (copy to patient). Perform exercises for:   15     minutes    3      times/day  [x] Supervised physical therapy  Frequency: []  1x week  [x] 2x week  [] 3x week  [] Other:   Duration: [] 2 weeks   [] 4 weeks  [x] 6 weeks  [] Other:     Additional Instructions:     Nancy Delgado MD, PhD

## 2021-07-22 ENCOUNTER — OFFICE VISIT (OUTPATIENT)
Dept: ORTHOPEDIC SURGERY | Age: 56
End: 2021-07-22
Payer: COMMERCIAL

## 2021-07-22 VITALS — BODY MASS INDEX: 25.77 KG/M2 | HEIGHT: 70 IN | WEIGHT: 180 LBS

## 2021-07-22 DIAGNOSIS — Z98.890 STATUS POST RIGHT ROTATOR CUFF REPAIR: Primary | ICD-10-CM

## 2021-07-22 PROCEDURE — 99213 OFFICE O/P EST LOW 20 MIN: CPT | Performed by: ORTHOPAEDIC SURGERY

## 2021-07-22 NOTE — PROGRESS NOTES
[facility] [address] [facility]  Office Visit  Follow up  Date:  2021    Name:  Rona Campbell  Address:  [address]    :  1965      Age:   60 y.o.    SSN:  [id number]      Medical Record Number:  <[id number]>    Chief Complaint:    Status post revision right rotator cuff repair with capsular release on 2021    HPI:   Rona Campbell is a 60 y.o. male who is following up guarding his right shoulder. He is roughly 6 months from the above procedure. States he still dealing with persistent pain and some weakness. States his range of motion he feels is good however painful. He does note that we did release him to light duty for his job however his job did not have any light duty for him to perform. He has since been let go from his job and is still being paid by [employer]. He denies any new numbness, tingling, fevers, chills, chest pain, shortness of breath, or any other new significant symptoms. Past History:  Past Medical History:   Diagnosis Date    HTN (hypertension)        Past Surgical History:   Procedure Laterality Date    RECTAL SURGERY      SHOULDER SURGERY Right     SHOULDER SURGERY Right 2021    TONSILLECTOMY         Social History     Tobacco Use    Smoking status: Never Smoker    Smokeless tobacco: Never Used   Substance Use Topics    Alcohol use: Never    Drug use: Never        Family History:  family history is not on file.          Current Outpatient Medications:     diclofenac (VOLTAREN) 50 MG EC tablet, Take 1 tablet by mouth 2 times daily (with meals), Disp: 60 tablet, Rfl: 3    montelukast (SINGULAIR) 10 MG tablet, TAKE ONE TABLET BY MOUTH ONCE DAILY, Disp: , Rfl:     EPINEPHrine (AUVI-Q) 0.3 MG/0.3ML SOAJ injection, Use as directed, Disp: , Rfl:     losartan (COZAAR) 50 MG tablet, TAKE ONE TABLET BY MOUTH ONCE DAILY, Disp: , Rfl:     sildenafil (VIAGRA) 100 MG tablet, Take 100 mg by mouth as needed, Disp: , Rfl:     meloxicam (MOBIC) 15 MG tablet, Take 15 mg by mouth daily as needed, Disp: , Rfl:     terbinafine (LAMISIL) 250 MG tablet, , Disp: , Rfl:     mupirocin (BACTROBAN) 2 % ointment, , Disp: , Rfl:     lansoprazole (PREVACID) 30 MG delayed release capsule, , Disp: , Rfl:     fexofenadine (ALLEGRA) 60 MG tablet, Take 60 mg by mouth as needed, Disp: , Rfl:     azelastine (ASTELIN) 0.1 % nasal spray, USE 2 SPRAYS IN EACH NOSTRIL 2 TIMES DAILY, Disp: , Rfl:       Allergies   Allergen Reactions    Penicillins Hives     NOT REALLY SURE  LONG TIME AGO             Review of Systems: A 14 point review of systems available in the scanned medical record as documented by the patient on 1/14/21. Today's review pertinent items are noted in HPI. Review of Systems    Patient has had no medical changes since last evaluated        Physical Exam:  Ht 5' 10\" (1.778 m)   Wt 180 lb (81.6 kg)   BMI 25.83 kg/m²     General: No acute distress, well nourished  CV: No obvious peripheral edema. Normal peripheral pulses  Neuro: Alert & oriented x 3  Psych: Normal mood and affect    Right shoulder exam    Grossly incisions well approximated, no warmth or redness  Gentle circumduction of the shoulder pain-free without crepitus  No tenderness palpation about the shoulder noted  Forward flexion150 degrees  Extension50 degrees  Abduction110 degrees  Adduction20 degrees  External rotation70 degrees  Internal rotation20 degrees    4+/5 strength external rotation, [place] toast  5/5 with internal rotation  Negative Neer's, negative Danielle    Radiographic:  No new imaging    Assessment:  Lisa Boston is a 60 y.o. male with:  1. Status post right rotator cuff revision repair and capsular release on 2/1/2021    Impression:  Encounter Diagnosis   Name Primary?     Status post right rotator cuff repair Yes       Office Procedures:  No orders of the defined types were placed in this encounter. Plan:   We discusssed with the patient that at this point he should continue his home exercises with hopes that he will continue to strengthen his rotator cuff. We can compute his impairment rating based on his shoulder range of motion today. Regarding his right shoulder we do not feel he needs to follow-up unless he has a setback. All questions were answered today. [address] Fellow    The encounter with Damaris Loo was supervised by Dr Adam Gerber who personally examined the patient and reviewed the plan. This dictation was performed with a verbal recognition program (DRAGON) and it was checked for errors. It is possible that there are still dictated errors within this office note. If so, please bring any errors to my attention for an addendum. All efforts were made to ensure that this office note is accurate.   _______________  I was physically present and personally supervised the Orthopaedic Sports Medicine Fellow in the evaluation and development of a treatment plan for this patient. I personally interviewed the patient and performed a physical examination. In addition, I discussed the patient's condition and treatment options with them. I have also reviewed and agree with the past medical, family and social history unless otherwise noted. All of the patient's questions were answered. Nancy Gerber MD, PhD  7/22/2021

## 2021-07-22 NOTE — LETTER
RE: Rand Matthew  : 1965      To whom it may concern,    I am placing Mr. Annita Baca at [facility] effective 21 and I can compute his PPI using the most recent range of motion measurements from 21. Based on the [facility] Guides 5th edition: Chapter 16: Table 16-3, Figures 16-40, -43, -46 the impairment is as follows:    FE to 150 degrees is a 2% UE impairment  Ext to 50 degrees is a 0% UE  Abduction to 110 degrees is a 3% UE   Adduction to 20 degrees is a 1% UE  ER to 70 degrees is a 0% UE  IR to 20 degrees is a 4% UE    The aggregate UE impairment is 10%, which translates to a whole person impairment of 6%. None of this is pre-existing the work related injury. So the PPI is 6% effective 21. I hope this helps in maintaining your records. Please let me know if additional information is needed. Nancy Glynn MD, PhD  2021

## 2021-08-05 ENCOUNTER — TELEPHONE (OUTPATIENT)
Dept: ORTHOPEDIC SURGERY | Age: 56
End: 2021-08-05

## 2021-08-05 NOTE — TELEPHONE ENCOUNTER
Scanned in to media a letter from [facility] claims rep    She wants to know if IW is at [address] and impairment rating.

## 2021-08-12 ENCOUNTER — OFFICE VISIT (OUTPATIENT)
Dept: ORTHOPEDIC SURGERY | Age: 56
End: 2021-08-12
Payer: COMMERCIAL

## 2021-08-12 VITALS — WEIGHT: 180 LBS | HEIGHT: 70 IN | BODY MASS INDEX: 25.77 KG/M2

## 2021-08-12 DIAGNOSIS — M75.122 COMPLETE TEAR OF LEFT ROTATOR CUFF, UNSPECIFIED WHETHER TRAUMATIC: ICD-10-CM

## 2021-08-12 DIAGNOSIS — Z98.890 STATUS POST RIGHT ROTATOR CUFF REPAIR: Primary | ICD-10-CM

## 2021-08-12 PROCEDURE — 99213 OFFICE O/P EST LOW 20 MIN: CPT | Performed by: ORTHOPAEDIC SURGERY

## 2021-08-12 NOTE — PROGRESS NOTES
[facility]  History and Physical  Shoulder Pain    Date:  2021    Name:  Kya Freitas  Address:  [address]    :  1965      Age:   60 y.o.    SSN:  [id number]      Medical Record Number:  <[id number]>    Reason for Visit:    Shoulder Pain (left )      HPI:   Kya Freitas is a 60 y.o. male who presents to our office today for follow up of the left shoulder pain. He has a full thickness large retracted rotator cuff tear. He was given a corticosteroid injection at the last visit but it did not provide long term relief. He reports he his motion is okay but his biggest complaint is the high pain levels. He denies any new injuries. Pain Assessment  Location of Pain: Shoulder  Location Modifiers: Left  Severity of Pain: 9        Review of Systems:  A 14 point review of systems available in the scanned medical record as documented by the patient. The review is negative with the exception of those things mentioned in the History of Present Illness and Past Medical History. Past Medical History:  Patient's medications, allergies, past medical, surgical, social and family histories were reviewed and updated as appropriate. Allergies: Allergies   Allergen Reactions    Penicillins Hives     NOT REALLY SURE  LONG TIME AGO           Physical Exam:  There were no vitals filed for this visit. General: Kya Freitas is a healthy and well appearing 60 y.o. male who is sitting comfortably in our office in acute distress. Skin:  There are no skin lesions, cellulitis, or extreme edema. The patient has warm and well-perfused Bilateral upper extremities with brisk capillary refill. Eyes: Extra-ocular muscles intact  Mouth: Oral mucosa moist. No perioral lesions  Pulm: Respirations unlabored and regular.   Neuro: Alert and oriented x3    left Shoulder Exam:  Inspection:  No gross deformities, no signs of infection. Palpation:  He has subacrominal crepitus, he has tenderness over the rotator cuff footprint. Active Range of Motion: Forward elevation of 150 and has pain on arm descent, abduction of 150 with a painful arc, external rotation with elbow at the side 35, internal rotation to the back is T12    Passive Range of Motion: deferred. Strength: External rotation with resistance with elbow at the side 3/5, internal rotation with resistance with elbow at the side 4/5, supraspinatus testing 3/5    Special Tests:  No Sonny muscle deformity. Neurovascular: Sensation to light touch is intact, no motor deficits, palpable radial pulses 2+    Additional Examinations:    Examination of the contralateral extremity does not show any tenderness, deformity or injury. Range of motion is unremarkable. There is no gross instability. There are no rashes, ulcerations or lesions. Strength and tone are normal.      Radiographic:  MRI left shoulder 6/14/21     FINDINGS: Bobby Parra has been repaired.       Diffuse abnormal signal distal infraspinatus and less so supraspinatus.  Predominantly    interstitial tearing present.  Subscapularis and teres minor are spared.       Postsurgical changes involve the AC joint and acromion.  No fracture or mass.       CONCLUSION:   1. Diffuse abnormal signal throughout the infraspinatus and less so supraspinatus.  Findings in    part from repair.  Interstitial partial-thickness tearing occupying at least 50% of the depth    present.  Reactive bursitis present. 2. Debrided superior labrum.  Chronic tearing present.  Capsulitis present. 3. Biceps long head is not visualized proximally.  Tenodesis along the anterior humerus has    been performed.         Assessment:  Jeffrey Hernandez is a 60 y.o. male with a massive retracted rotator cuff tear. The tear is essentially irreparable. Impression:  Encounter Diagnoses   Name Primary?     Status post right rotator cuff repair Yes    Complete tear of left rotator cuff, unspecified whether traumatic        Office Procedures:  No orders of the defined types were placed in this encounter. Plan:     We again discussed the various treatment options with Kayley Hines. He was told that it is the type of tear that cannot be repaired. We again discussed superior capsular reconstruction vs.a balloon arthroplasty. Between the two options the patient reports the balloon arthroplasty is the better option for him unfortunately since he is under the age of 70 it would be done off label. We discussed that it is too soon to consider a corticosteroid injection today. We also discussed Visco supplementation and feel that he may not benefit a lot from that injection as he does not show a lot of arthritic changes within the joint. Patient verbalized understanding of his available treatment options at this time. We will see him back in 2 months to consider corticosteroid injection. All questions were answered. 8/12/2021  12:46 PM      Irais Shahid PA-C  Orthopaedic Sports Medicine Physician Assistant    During this examination, Irais BRYSON PA-C, functioned as a scribe for Dr. Yelena Green. This dictation was performed with a verbal recognition program (DRAGON) and it was checked for errors. It is possible that there are still dictated errors within this office note. If so, please bring any errors to my attention for an addendum. All efforts were made to ensure that this office note is accurate.  ___________________  I, Dr. Yelena Green, personally performed the services described in this documentation as described by Irais Shahid PA-C in my presence, and it is both accurate and complete. Nancy Garrido MD, PhD  8/12/2021

## 2021-10-28 ENCOUNTER — TELEPHONE (OUTPATIENT)
Dept: ORTHOPEDIC SURGERY | Age: 56
End: 2021-10-28

## 2021-10-28 NOTE — TELEPHONE ENCOUNTER
General Question     Subject: ?? Patient and /or Facility Request: PATIENT REQ THAT Yuval [id number] HIM.  DID NOT GIVE REASON  Contact Number: [phone]

## 2021-11-04 ENCOUNTER — OFFICE VISIT (OUTPATIENT)
Dept: ORTHOPEDIC SURGERY | Age: 56
End: 2021-11-04
Payer: COMMERCIAL

## 2021-11-04 VITALS — HEIGHT: 70 IN | WEIGHT: 180 LBS | BODY MASS INDEX: 25.77 KG/M2

## 2021-11-04 DIAGNOSIS — M75.122 COMPLETE TEAR OF LEFT ROTATOR CUFF, UNSPECIFIED WHETHER TRAUMATIC: Primary | ICD-10-CM

## 2021-11-04 PROCEDURE — 20610 DRAIN/INJ JOINT/BURSA W/O US: CPT | Performed by: ORTHOPAEDIC SURGERY

## 2021-11-04 RX ORDER — LIDOCAINE HYDROCHLORIDE 10 MG/ML
8 INJECTION, SOLUTION INFILTRATION; PERINEURAL ONCE
Status: COMPLETED | OUTPATIENT
Start: 2021-11-04 | End: 2021-11-04

## 2021-11-04 RX ORDER — METHYLPREDNISOLONE ACETATE 40 MG/ML
80 INJECTION, SUSPENSION INTRA-ARTICULAR; INTRALESIONAL; INTRAMUSCULAR; SOFT TISSUE ONCE
Status: COMPLETED | OUTPATIENT
Start: 2021-11-04 | End: 2021-11-04

## 2021-11-04 RX ADMIN — METHYLPREDNISOLONE ACETATE 80 MG: 40 INJECTION, SUSPENSION INTRA-ARTICULAR; INTRALESIONAL; INTRAMUSCULAR; SOFT TISSUE at 15:20

## 2021-11-04 RX ADMIN — LIDOCAINE HYDROCHLORIDE 8 ML: 10 INJECTION, SOLUTION INFILTRATION; PERINEURAL at 15:19

## 2021-11-04 NOTE — PROGRESS NOTES
infection. Posterior rotator cuff atrophy    Palpation: Tenderness over greater tuberosity    Active Range of Motion: Forward Elevation 155 with pain on arm descent    Passive Range of Motion: Deferred    Strength:  External Rotation 4-/5    Special Tests:  Negative lag, No Sonny muscle deformity. Neurovascular: Sensation to light touch is intact, no motor deficits, palpable radial pulses 2+      Radiology:     No new XR obtained at this time. Assessment :  Mr. Clenton Bamberger is a pleasant, 60 y.o. patient with massive, retracted rotator cuff tear, as seen on MRI. Impression:  Encounter Diagnosis   Name Primary?  Complete tear of left rotator cuff, unspecified whether traumatic Yes       Office Procedures:  Orders Placed This Encounter   Procedures    WI ARTHROCENTESIS ASPIR&/INJ MAJOR JT/BURSA W/O US     Procedure Note:  After discussing the risks and benefits of a corticosteroid injection, Alistair Gallegos did state an understanding and gave verbal consent to proceed. After cleansing the injection site with Chlora-prep and using aseptic techniques,  2 CC of Depo Medrol 40mg/ml and 8 CC of 1% lidocaine were injected in the left glenohumeral joint. He  tolerated the procedure well with no immediate adverse sequelae after the injection. A bandage was placed over the injection site. Appropriate post injections instructions were given to the patient. Treatment Plan:  We discussed his diagnosis and treatment options at length. He essentially has two options moving forward. We discussed he may be a candidate for the balloon arthroplasty operation. We discussed in detail risks, benefits and expectations postoperatively. We also discussed a recovery timeline following this operation. We feel he is an excellent candidate for this operation. Even postoperatively we recommend he continue working only part time as tolerated. His other option is to continue in conservative management.  We could repeat the injection today and have him resume his home exercises. After discussing these options he would like to move forward with planning for this operation, however he wants to put this off until spring. He would like to discuss timing further with his wife. This is appropriate. He will reach out to us when he is ready to move forward with planning. In the mean time we will proceed with an injection today. We will see Flako Torres back as needed. All questions were answered to patient's satisfaction and He was encouraged to call with any further questions or concerns. Erica Corado is in agreement with this plan. 11/4/2021  2:30 PM    Pino Jung ATC  [facility] [address] [facility]    During this examination, I, Isabel Tong, functioned as a scribe for Dr. Damien Corbin. The history taking and physical examination were performed by Dr. Zainab Wheatley. All counseling during the appointment was performed between the patient and Dr. Zainab Wheatley. 11/4/21  ______________  I, Dr. Damien Corbin, personally performed the services described in this documentation as described by Pino Jung ATC in my presence, and it is both accurate and complete. Nancy Wheatley MD, PhD  11/4/2021

## 2021-11-16 ENCOUNTER — TELEPHONE (OUTPATIENT)
Dept: ORTHOPEDIC SURGERY | Age: 56
End: 2021-11-16

## 2021-11-16 NOTE — TELEPHONE ENCOUNTER
General Question     Subject: PATIENT STATES HE NEED COPIES OF MEDICAL ISSUES. HE NEEDS TO SPEAK WITH LESLY.     Patient:  Medhat Bruner  Contact Number: [phone]

## 2021-12-07 ENCOUNTER — TELEPHONE (OUTPATIENT)
Dept: ORTHOPEDIC SURGERY | Age: 56
End: 2021-12-07

## 2024-11-25 ENCOUNTER — OFFICE VISIT (OUTPATIENT)
Dept: ORTHOPEDIC SURGERY | Age: 59
End: 2024-11-25
Payer: MEDICARE

## 2024-11-25 VITALS — WEIGHT: 180 LBS | BODY MASS INDEX: 25.77 KG/M2 | HEIGHT: 70 IN

## 2024-11-25 DIAGNOSIS — M18.12 ARTHRITIS OF CARPOMETACARPAL (CMC) JOINT OF LEFT THUMB: ICD-10-CM

## 2024-11-25 DIAGNOSIS — R52 PAIN: Primary | ICD-10-CM

## 2024-11-25 PROCEDURE — G8419 CALC BMI OUT NRM PARAM NOF/U: HCPCS | Performed by: PHYSICIAN ASSISTANT

## 2024-11-25 PROCEDURE — G8428 CUR MEDS NOT DOCUMENT: HCPCS | Performed by: PHYSICIAN ASSISTANT

## 2024-11-25 PROCEDURE — L3924 HFO WITHOUT JOINTS PRE OTS: HCPCS | Performed by: PHYSICIAN ASSISTANT

## 2024-11-25 PROCEDURE — 1036F TOBACCO NON-USER: CPT | Performed by: PHYSICIAN ASSISTANT

## 2024-11-25 PROCEDURE — 20600 DRAIN/INJ JOINT/BURSA W/O US: CPT | Performed by: PHYSICIAN ASSISTANT

## 2024-11-25 PROCEDURE — 99203 OFFICE O/P NEW LOW 30 MIN: CPT | Performed by: PHYSICIAN ASSISTANT

## 2024-11-25 PROCEDURE — G8484 FLU IMMUNIZE NO ADMIN: HCPCS | Performed by: PHYSICIAN ASSISTANT

## 2024-11-25 PROCEDURE — 3017F COLORECTAL CA SCREEN DOC REV: CPT | Performed by: PHYSICIAN ASSISTANT

## 2024-11-25 RX ORDER — BETAMETHASONE SODIUM PHOSPHATE AND BETAMETHASONE ACETATE 3; 3 MG/ML; MG/ML
12 INJECTION, SUSPENSION INTRA-ARTICULAR; INTRALESIONAL; INTRAMUSCULAR; SOFT TISSUE ONCE
Status: COMPLETED | OUTPATIENT
Start: 2024-11-25 | End: 2024-11-25

## 2024-11-25 RX ORDER — LIDOCAINE HYDROCHLORIDE 10 MG/ML
5 INJECTION, SOLUTION INFILTRATION; PERINEURAL ONCE
Status: COMPLETED | OUTPATIENT
Start: 2024-11-25 | End: 2024-11-25

## 2024-11-25 RX ADMIN — LIDOCAINE HYDROCHLORIDE 5 ML: 10 INJECTION, SOLUTION INFILTRATION; PERINEURAL at 10:38

## 2024-11-25 RX ADMIN — BETAMETHASONE SODIUM PHOSPHATE AND BETAMETHASONE ACETATE 12 MG: 3; 3 INJECTION, SUSPENSION INTRA-ARTICULAR; INTRALESIONAL; INTRAMUSCULAR; SOFT TISSUE at 10:38

## 2024-11-25 SDOH — HEALTH STABILITY: PHYSICAL HEALTH: ON AVERAGE, HOW MANY DAYS PER WEEK DO YOU ENGAGE IN MODERATE TO STRENUOUS EXERCISE (LIKE A BRISK WALK)?: 2 DAYS

## 2024-11-25 SDOH — HEALTH STABILITY: PHYSICAL HEALTH: ON AVERAGE, HOW MANY MINUTES DO YOU ENGAGE IN EXERCISE AT THIS LEVEL?: 60 MIN

## 2024-11-25 NOTE — PROGRESS NOTES
Chief Complaint    Hand Pain (Np Left Thumb)      History of Present Illness:  Meet Almonte is a 59 y.o. male patient presents to the office today for a new problem.  Patient here with a chief complaint of left thumb pain.  He has not recall any specific injury or trauma but has been doing a large amount of work on his deck.  His pain is concentrated over the first CMC joint.  No neurologic symptoms to report.       Medical History:  Patient's medications, allergies, past medical, surgical, social and family histories were reviewed and updated as appropriate.    Review of Systems:  Pertinent items are noted in HPI  Review of systems reviewed from Patient History Form dated on 11/25/2024 and available in the patient's chart under the Media tab.     Vital Signs:  There were no vitals taken for this visit.    General Exam:   Constitutional: Patient is adequately groomed with no evidence of malnutrition  DTRs: Deep tendon reflexes are intact  Mental Status: The patient is oriented to time, place and person.  The patient's mood and affect are appropriate.  Lymphatic: The lymphatic examination bilaterally reveals all areas to be without enlargement or induration.  Vascular: Examination reveals no swelling or calf tenderness.  Peripheral pulses are palpable and 2+.  Neurological: The patient has good coordination.  There is no weakness or sensory deficit.    Left thumb Examination:    Inspection: No obvious swelling or ecchymosis    Palpation: Tenderness over first CMC joint    Range of Motion: Decreased range of motion    Strength: 4/5  and pinch strength.      Special Tests: No pain over anatomical snuffbox.  Positive first CMC joint grind test    Skin: There are no rashes, ulcerations or lesions.    Gait: Normal    Reflex +2    Additional Comments:       Additional Examinations:         Right Upper Extremity:  Examination of the right upper extremity does not show any tenderness, deformity or injury.  Range of

## 2024-11-29 ENCOUNTER — TELEPHONE (OUTPATIENT)
Dept: ORTHOPEDIC SURGERY | Age: 59
End: 2024-11-29

## 2024-11-29 NOTE — TELEPHONE ENCOUNTER
General Question     Subject:THINKS HE LEFT HIS INSURANCE CARD THERE MONDAY.  Patient and /or Facility Request: Meet Almonte   Contact Number: [phone]

## 2025-01-13 ENCOUNTER — OFFICE VISIT (OUTPATIENT)
Dept: ORTHOPEDIC SURGERY | Age: 60
End: 2025-01-13
Payer: MEDICARE

## 2025-01-13 VITALS — BODY MASS INDEX: 25.77 KG/M2 | HEIGHT: 70 IN | WEIGHT: 180 LBS

## 2025-01-13 DIAGNOSIS — M18.12 ARTHRITIS OF CARPOMETACARPAL (CMC) JOINT OF LEFT THUMB: Primary | ICD-10-CM

## 2025-01-13 PROCEDURE — 1036F TOBACCO NON-USER: CPT | Performed by: PHYSICIAN ASSISTANT

## 2025-01-13 PROCEDURE — G8427 DOCREV CUR MEDS BY ELIG CLIN: HCPCS | Performed by: PHYSICIAN ASSISTANT

## 2025-01-13 PROCEDURE — 3017F COLORECTAL CA SCREEN DOC REV: CPT | Performed by: PHYSICIAN ASSISTANT

## 2025-01-13 PROCEDURE — 99214 OFFICE O/P EST MOD 30 MIN: CPT | Performed by: PHYSICIAN ASSISTANT

## 2025-01-13 PROCEDURE — G8419 CALC BMI OUT NRM PARAM NOF/U: HCPCS | Performed by: PHYSICIAN ASSISTANT

## 2025-01-13 NOTE — PROGRESS NOTES
Chief Complaint    Follow-up (CK Left Thumb (Cortisone 11/25/2024) )      History of Present Illness:  Meet Almonte is a 59 y.o. male presents to the office today for follow-up visit.  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  He did receive an intra-articular cortisone injection on November 25, 2024.  The patient really has not done well with continued pain especially with activities at the first CMC joint.  No neurologic symptoms    Previous history: Patient presents to the office today for a new problem.  Patient here with a chief complaint of left thumb pain.  He has not recall any specific injury or trauma but has been doing a large amount of work on his deck.  His pain is concentrated over the first CMC joint.  No neurologic symptoms to report.  Pain Assessment  Location of Pain: Finger  Location Modifiers: Left  Severity of Pain: 4  Quality of Pain: Aching  Duration of Pain: Persistent  Frequency of Pain: Intermittent    Medical History:  Patient's medications, allergies, past medical, surgical, social and family histories were reviewed and updated as appropriate.    Review of Systems:  Pertinent items are noted in HPI  Review of systems reviewed from Patient History Form dated on 11/25/2024 and available in the patient's chart under the Media tab.     Vital Signs:  Ht 1.778 m (5' 10\")   Wt 81.6 kg (180 lb)   BMI 25.83 kg/m²     General Exam:   Constitutional: Patient is adequately groomed with no evidence of malnutrition  DTRs: Deep tendon reflexes are intact  Mental Status: The patient is oriented to time, place and person.  The patient's mood and affect are appropriate.  Lymphatic: The lymphatic examination bilaterally reveals all areas to be without enlargement or induration.  Vascular: Examination reveals no swelling or calf tenderness.  Peripheral pulses are palpable and 2+.  Neurological: The patient has good coordination.  There is no weakness or sensory

## 2025-01-31 ENCOUNTER — OFFICE VISIT (OUTPATIENT)
Dept: ORTHOPEDIC SURGERY | Age: 60
End: 2025-01-31

## 2025-01-31 VITALS — HEIGHT: 70 IN | BODY MASS INDEX: 25.77 KG/M2 | WEIGHT: 180 LBS

## 2025-01-31 DIAGNOSIS — M18.12 ARTHRITIS OF CARPOMETACARPAL (CMC) JOINT OF LEFT THUMB: Primary | ICD-10-CM

## 2025-01-31 RX ORDER — BETAMETHASONE SODIUM PHOSPHATE AND BETAMETHASONE ACETATE 3; 3 MG/ML; MG/ML
3 INJECTION, SUSPENSION INTRA-ARTICULAR; INTRALESIONAL; INTRAMUSCULAR; SOFT TISSUE ONCE
Status: COMPLETED | OUTPATIENT
Start: 2025-01-31 | End: 2025-01-31

## 2025-01-31 RX ORDER — LIDOCAINE HYDROCHLORIDE 10 MG/ML
1.5 INJECTION, SOLUTION INFILTRATION; PERINEURAL ONCE
Status: COMPLETED | OUTPATIENT
Start: 2025-01-31 | End: 2025-01-31

## 2025-01-31 RX ADMIN — BETAMETHASONE SODIUM PHOSPHATE AND BETAMETHASONE ACETATE 3 MG: 3; 3 INJECTION, SUSPENSION INTRA-ARTICULAR; INTRALESIONAL; INTRAMUSCULAR; SOFT TISSUE at 09:34

## 2025-01-31 RX ADMIN — LIDOCAINE HYDROCHLORIDE 1.5 ML: 10 INJECTION, SOLUTION INFILTRATION; PERINEURAL at 09:35

## 2025-01-31 NOTE — PROGRESS NOTES
Hand, Upper Extremity and Reconstructive Surgery                Judi Lewis MD                                           Summary of Upper Extremity Problems and Interventions     Diagnosis: Left thumb evaluation    History of Present Illness     History of Present Illness  The patient is a 60-year-old right hand dominant male presents with left thumb pain.  He was previously evaluated by Juan Carlos Vegas who performed a steroid injection on 11/25/2024.  He states he did not have improvement of his symptoms following this injection.  He intermittently wears a thumb brace. The patient reports severe, intermittent left thumb pain persisting for 6 to 8 months, accompanied by difficulty performing tasks such as opening jars due to pain and weakness. He denies any numbness, paresthesia, diabetes mellitus, prior injuries, or surgeries.     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 He was advised to initiate Cimzia for autoimmune issues but was unable to do so due to cost constraints.     SOCIAL HISTORY  He works as a farmer and a .      Allergies     Allergies   Allergen Reactions    Penicillins Hives     NOT REALLY SURE  LONG TIME AGO           Home Medications     Current Outpatient Medications   Medication Instructions    azelastine (ASTELIN) 0.1 % nasal spray USE 2 SPRAYS IN EACH NOSTRIL 2 TIMES DAILY    diclofenac (VOLTAREN) 50 mg, Oral, 2 TIMES DAILY WITH MEALS    diclofenac sodium (VOLTAREN) 2 g, Topical, 4 TIMES DAILY    EPINEPHrine (AUVI-Q) 0.3 MG/0.3ML SOAJ injection Use as directed    fexofenadine (ALLEGRA) 60 mg, Oral, PRN    lansoprazole (PREVACID) 30 MG delayed release capsule No dose, route, or frequency recorded.    losartan (COZAAR) 50 MG tablet TAKE ONE TABLET BY MOUTH ONCE DAILY    meloxicam (MOBIC) 15 mg, Oral, DAILY PRN    montelukast (SINGULAIR) 10 MG tablet TAKE ONE TABLET BY

## 2025-02-28 ENCOUNTER — OFFICE VISIT (OUTPATIENT)
Dept: ORTHOPEDIC SURGERY | Age: 60
End: 2025-02-28
Payer: MEDICARE

## 2025-02-28 VITALS — BODY MASS INDEX: 25.77 KG/M2 | HEIGHT: 70 IN | WEIGHT: 180 LBS

## 2025-02-28 DIAGNOSIS — M18.12 ARTHRITIS OF CARPOMETACARPAL (CMC) JOINT OF LEFT THUMB: Primary | ICD-10-CM

## 2025-02-28 PROCEDURE — 99203 OFFICE O/P NEW LOW 30 MIN: CPT | Performed by: STUDENT IN AN ORGANIZED HEALTH CARE EDUCATION/TRAINING PROGRAM

## 2025-02-28 PROCEDURE — G8419 CALC BMI OUT NRM PARAM NOF/U: HCPCS | Performed by: STUDENT IN AN ORGANIZED HEALTH CARE EDUCATION/TRAINING PROGRAM

## 2025-02-28 PROCEDURE — G8427 DOCREV CUR MEDS BY ELIG CLIN: HCPCS | Performed by: STUDENT IN AN ORGANIZED HEALTH CARE EDUCATION/TRAINING PROGRAM

## 2025-02-28 PROCEDURE — 1036F TOBACCO NON-USER: CPT | Performed by: STUDENT IN AN ORGANIZED HEALTH CARE EDUCATION/TRAINING PROGRAM

## 2025-02-28 PROCEDURE — 3017F COLORECTAL CA SCREEN DOC REV: CPT | Performed by: STUDENT IN AN ORGANIZED HEALTH CARE EDUCATION/TRAINING PROGRAM

## 2025-02-28 NOTE — PROGRESS NOTES
Hand, Upper Extremity and Reconstructive Surgery                Judi Lewis MD                                           Summary of Upper Extremity Problems and Interventions     Diagnosis: Left thumb evaluation    History of Present Illness     History of Present Illness  Interval update 2/28/2025-patient presents for follow-up of his left thumb CMC arthritis.  He has no improvement since his last CMC joint injection.  Has been intermittently wearing a brace.  Takes Celebrex at baseline.  Has used Voltaren gel in the past with minimal benefit.    History:  The patient is a 60-year-old right hand dominant male presents with left thumb pain.  He was previously evaluated by Juan Carlos Vegas who performed a steroid injection on 11/25/2024.  He states he did not have improvement of his symptoms following this injection.  He intermittently wears a thumb brace. The patient reports severe, intermittent left thumb pain persisting for 6 to 8 months, accompanied by difficulty performing tasks such as opening jars due to pain and weakness. He denies any numbness, paresthesia, diabetes mellitus, prior injuries, or surgeries.     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 He was advised to initiate Cimzia for autoimmune issues but was unable to do so due to cost constraints.     SOCIAL HISTORY  He works as a farmer and a .    Allergies     Allergies   Allergen Reactions    Penicillins Hives     NOT REALLY SURE  LONG TIME AGO           Home Medications     Current Outpatient Medications   Medication Instructions    azelastine (ASTELIN) 0.1 % nasal spray USE 2 SPRAYS IN EACH NOSTRIL 2 TIMES DAILY    diclofenac (VOLTAREN) 50 mg, Oral, 2 TIMES DAILY WITH MEALS    diclofenac sodium (VOLTAREN) 2 g, Topical, 4 TIMES DAILY    EPINEPHrine (AUVI-Q) 0.3 MG/0.3ML SOAJ injection Use as directed    fexofenadine

## 2025-03-27 ENCOUNTER — OFFICE VISIT (OUTPATIENT)
Dept: ORTHOPEDIC SURGERY | Age: 60
End: 2025-03-27
Payer: MEDICARE

## 2025-03-27 VITALS — HEIGHT: 70 IN | BODY MASS INDEX: 25.77 KG/M2 | WEIGHT: 180 LBS

## 2025-03-27 DIAGNOSIS — M25.512 LEFT SHOULDER PAIN, UNSPECIFIED CHRONICITY: ICD-10-CM

## 2025-03-27 DIAGNOSIS — M75.122 NONTRAUMATIC COMPLETE TEAR OF LEFT ROTATOR CUFF: Primary | ICD-10-CM

## 2025-03-27 PROCEDURE — 20610 DRAIN/INJ JOINT/BURSA W/O US: CPT | Performed by: ORTHOPAEDIC SURGERY

## 2025-03-27 PROCEDURE — G8419 CALC BMI OUT NRM PARAM NOF/U: HCPCS | Performed by: ORTHOPAEDIC SURGERY

## 2025-03-27 PROCEDURE — 1036F TOBACCO NON-USER: CPT | Performed by: ORTHOPAEDIC SURGERY

## 2025-03-27 PROCEDURE — 99203 OFFICE O/P NEW LOW 30 MIN: CPT | Performed by: ORTHOPAEDIC SURGERY

## 2025-03-27 PROCEDURE — 3017F COLORECTAL CA SCREEN DOC REV: CPT | Performed by: ORTHOPAEDIC SURGERY

## 2025-03-27 PROCEDURE — G8427 DOCREV CUR MEDS BY ELIG CLIN: HCPCS | Performed by: ORTHOPAEDIC SURGERY

## 2025-03-27 RX ORDER — PREDNISOLONE ACETATE 10 MG/ML
SUSPENSION/ DROPS OPHTHALMIC
COMMUNITY

## 2025-03-27 RX ORDER — HYDROXYZINE PAMOATE 50 MG/1
CAPSULE ORAL
COMMUNITY

## 2025-03-27 RX ORDER — PREGABALIN 75 MG/1
75 CAPSULE ORAL 2 TIMES DAILY
COMMUNITY

## 2025-03-27 RX ORDER — CELECOXIB 200 MG/1
200 CAPSULE ORAL 2 TIMES DAILY PRN
COMMUNITY
Start: 2025-01-08

## 2025-03-27 NOTE — PROGRESS NOTES
Treatment Plan:  A long discussion was had with Meet Almonte today. The patient has had success with a corticosteroid injection that was performed back on 11/4/21. At this point we believe that it is appropriate to try another injection to see if that patient has the same positive reaction. We also recommend that the patient start some formal physical therapy to start to form home exercise program. We also discussed with the patient the possibility of surgical intervention in the event that the patient would fail conservative treatment. The patient is otherwise a great candidate for an arthroscopic subacromial balloon spacer. The patient reports understanding and is in agreement with the treatment plan.     After discussing the risks and benefits of a corticosteroid injection, Meet did state an understanding and gave verbal consent to proceed.  After cleansing the injection site with Chlora-prep and using aseptic techniques,  2 CC of Depo Medrol 40mg/ml and 8 CC of 1% lidocaine were injected in the left subacromial space. He  tolerated the procedure well with no immediate adverse sequelae after the injection.  A bandage was placed over the injection site. Appropriate post injections instructions were given to the patient.    We will see Meet back in 6 weeks and/or as needed. All questions were answered to patient's satisfaction and He was encouraged to call with any further questions or concerns. Meet Almonte is in agreement with this plan.    3/27/2025  10:46 AM    Saurabh Low ATC    [facility]    During this examination, Saurabh BRYSON ATC, functioned as a scribe for Dr. Nancy Flanagan. The history taking and physical examination were performed by Dr. Flanagan. All counseling during the appointment was performed between the patient and Dr. Flanagan. 3/27/25  ______________  BRAYDON, Dr. Nancy Flanagan, personally performed the services described in this

## 2025-03-28 RX ORDER — METHYLPREDNISOLONE ACETATE 40 MG/ML
80 INJECTION, SUSPENSION INTRA-ARTICULAR; INTRALESIONAL; INTRAMUSCULAR; SOFT TISSUE ONCE
Status: COMPLETED | OUTPATIENT
Start: 2025-03-28 | End: 2025-03-28

## 2025-03-28 RX ORDER — LIDOCAINE HYDROCHLORIDE 10 MG/ML
8 INJECTION, SOLUTION INFILTRATION; PERINEURAL ONCE
Status: COMPLETED | OUTPATIENT
Start: 2025-03-28 | End: 2025-03-28

## 2025-03-28 RX ADMIN — METHYLPREDNISOLONE ACETATE 80 MG: 40 INJECTION, SUSPENSION INTRA-ARTICULAR; INTRALESIONAL; INTRAMUSCULAR; SOFT TISSUE at 15:09

## 2025-03-28 RX ADMIN — LIDOCAINE HYDROCHLORIDE 8 ML: 10 INJECTION, SOLUTION INFILTRATION; PERINEURAL at 15:09

## 2025-04-04 ENCOUNTER — OFFICE VISIT (OUTPATIENT)
Dept: ORTHOPEDIC SURGERY | Age: 60
End: 2025-04-04
Payer: MEDICARE

## 2025-04-04 VITALS — BODY MASS INDEX: 25.77 KG/M2 | WEIGHT: 180 LBS | HEIGHT: 70 IN

## 2025-04-04 DIAGNOSIS — M18.12 ARTHRITIS OF CARPOMETACARPAL (CMC) JOINT OF LEFT THUMB: Primary | ICD-10-CM

## 2025-04-04 PROCEDURE — 20600 DRAIN/INJ JOINT/BURSA W/O US: CPT | Performed by: STUDENT IN AN ORGANIZED HEALTH CARE EDUCATION/TRAINING PROGRAM

## 2025-04-04 PROCEDURE — G8427 DOCREV CUR MEDS BY ELIG CLIN: HCPCS | Performed by: STUDENT IN AN ORGANIZED HEALTH CARE EDUCATION/TRAINING PROGRAM

## 2025-04-04 PROCEDURE — G8419 CALC BMI OUT NRM PARAM NOF/U: HCPCS | Performed by: STUDENT IN AN ORGANIZED HEALTH CARE EDUCATION/TRAINING PROGRAM

## 2025-04-04 PROCEDURE — 1036F TOBACCO NON-USER: CPT | Performed by: STUDENT IN AN ORGANIZED HEALTH CARE EDUCATION/TRAINING PROGRAM

## 2025-04-04 PROCEDURE — 99214 OFFICE O/P EST MOD 30 MIN: CPT | Performed by: STUDENT IN AN ORGANIZED HEALTH CARE EDUCATION/TRAINING PROGRAM

## 2025-04-04 PROCEDURE — 3017F COLORECTAL CA SCREEN DOC REV: CPT | Performed by: STUDENT IN AN ORGANIZED HEALTH CARE EDUCATION/TRAINING PROGRAM

## 2025-04-04 RX ORDER — LIDOCAINE HYDROCHLORIDE 10 MG/ML
1.5 INJECTION, SOLUTION INFILTRATION; PERINEURAL ONCE
Status: COMPLETED | OUTPATIENT
Start: 2025-04-04 | End: 2025-04-04

## 2025-04-04 RX ORDER — BETAMETHASONE SODIUM PHOSPHATE AND BETAMETHASONE ACETATE 3; 3 MG/ML; MG/ML
3 INJECTION, SUSPENSION INTRA-ARTICULAR; INTRALESIONAL; INTRAMUSCULAR; SOFT TISSUE ONCE
Status: COMPLETED | OUTPATIENT
Start: 2025-04-04 | End: 2025-04-04

## 2025-04-04 RX ADMIN — BETAMETHASONE SODIUM PHOSPHATE AND BETAMETHASONE ACETATE 3 MG: 3; 3 INJECTION, SUSPENSION INTRA-ARTICULAR; INTRALESIONAL; INTRAMUSCULAR; SOFT TISSUE at 15:13

## 2025-04-04 RX ADMIN — LIDOCAINE HYDROCHLORIDE 1.5 ML: 10 INJECTION, SOLUTION INFILTRATION; PERINEURAL at 15:14

## 2025-05-01 ENCOUNTER — OFFICE VISIT (OUTPATIENT)
Dept: ORTHOPEDIC SURGERY | Age: 60
End: 2025-05-01
Payer: MEDICARE

## 2025-05-01 VITALS — BODY MASS INDEX: 25.77 KG/M2 | HEIGHT: 70 IN | WEIGHT: 180 LBS

## 2025-05-01 DIAGNOSIS — M75.122 NONTRAUMATIC COMPLETE TEAR OF LEFT ROTATOR CUFF: Primary | ICD-10-CM

## 2025-05-01 PROCEDURE — G8419 CALC BMI OUT NRM PARAM NOF/U: HCPCS | Performed by: ORTHOPAEDIC SURGERY

## 2025-05-01 PROCEDURE — 1036F TOBACCO NON-USER: CPT | Performed by: ORTHOPAEDIC SURGERY

## 2025-05-01 PROCEDURE — 99213 OFFICE O/P EST LOW 20 MIN: CPT | Performed by: ORTHOPAEDIC SURGERY

## 2025-05-01 PROCEDURE — G8427 DOCREV CUR MEDS BY ELIG CLIN: HCPCS | Performed by: ORTHOPAEDIC SURGERY

## 2025-05-01 PROCEDURE — 3017F COLORECTAL CA SCREEN DOC REV: CPT | Performed by: ORTHOPAEDIC SURGERY

## 2025-05-01 RX ORDER — QUETIAPINE FUMARATE 50 MG/1
50 TABLET, FILM COATED ORAL NIGHTLY
COMMUNITY

## 2025-05-01 NOTE — PROGRESS NOTES
Chief Complaint    Shoulder Pain (LEFT SHOULDER)      History of Present Illness:  Meet Almonte is a pleasant, 60 y.o., male, here today for follow up of his left shoulder. Meet Almonte is an old patient of Dr. Flanagan's that was last seen in the office on 11/4/21. The patient has been diagnosed via MRI with a left shoulder massive full thickness rotator cuff tear from a mechanical fall on May 2020. The tear was deemed irreparable and had been receiving conservative treatment including corticosteroid injections and physical therapy. The patient reports that the injection had help during his last visit but his shoulder has continued to get progressively worse in regards to function and motion. The patient is also currently taking lyrica and celebrex. He reports no new injuries or setbacks.     Of note the patient has also had a right shoulder rotator cuff repair by Dr. Flanagan in February of 2021. He reports his right shoulder is doing very well.    Meet Almonte returns today for follow-up after his injection.  Fortunately the patient has had good relief of pain after.  He has not been able to go to physical therapy secondary to being very busy.  He is a farmer and keeps very busy.    Pain Assessment  Location of Pain: Shoulder  Location Modifiers: Left  Severity of Pain: 0  Frequency of Pain: Intermittent  Aggravating Factors: Other (Comment)  Limiting Behavior: Some  Relieving Factors: Rest  Work-Related Injury: No  Are there other pain locations you wish to document?: No      Medical History:  Patient's medications, allergies, past medical, surgical, social and family histories were reviewed and updated as appropriate.    No notes on file    Review of Systems  A 14 point review of systems was completed by the patient and is available in the media section of the scanned medical record and was reviewed on 5/1/2025.  The review is negative with the exception of those things mentioned in the HPI and Past

## 2025-08-07 ENCOUNTER — OFFICE VISIT (OUTPATIENT)
Dept: ORTHOPEDIC SURGERY | Age: 60
End: 2025-08-07

## 2025-08-07 DIAGNOSIS — M25.512 LEFT SHOULDER PAIN, UNSPECIFIED CHRONICITY: ICD-10-CM

## 2025-08-07 DIAGNOSIS — M75.122 NONTRAUMATIC COMPLETE TEAR OF LEFT ROTATOR CUFF: Primary | ICD-10-CM

## 2025-08-07 RX ORDER — LIDOCAINE HYDROCHLORIDE 10 MG/ML
8 INJECTION, SOLUTION INFILTRATION; PERINEURAL ONCE
Status: COMPLETED | OUTPATIENT
Start: 2025-08-07 | End: 2025-08-07

## 2025-08-07 RX ORDER — METHYLPREDNISOLONE ACETATE 40 MG/ML
80 INJECTION, SUSPENSION INTRA-ARTICULAR; INTRALESIONAL; INTRAMUSCULAR; SOFT TISSUE ONCE
Status: COMPLETED | OUTPATIENT
Start: 2025-08-07 | End: 2025-08-07

## 2025-08-07 RX ADMIN — METHYLPREDNISOLONE ACETATE 80 MG: 40 INJECTION, SUSPENSION INTRA-ARTICULAR; INTRALESIONAL; INTRAMUSCULAR; SOFT TISSUE at 15:23

## 2025-08-07 RX ADMIN — LIDOCAINE HYDROCHLORIDE 8 ML: 10 INJECTION, SOLUTION INFILTRATION; PERINEURAL at 15:22
